# Patient Record
Sex: FEMALE | Race: WHITE | NOT HISPANIC OR LATINO | Employment: FULL TIME | URBAN - NONMETROPOLITAN AREA
[De-identification: names, ages, dates, MRNs, and addresses within clinical notes are randomized per-mention and may not be internally consistent; named-entity substitution may affect disease eponyms.]

---

## 2017-02-17 ENCOUNTER — INITIAL PRENATAL (OUTPATIENT)
Dept: OBSTETRICS AND GYNECOLOGY | Facility: CLINIC | Age: 27
End: 2017-02-17

## 2017-02-17 VITALS
DIASTOLIC BLOOD PRESSURE: 80 MMHG | BODY MASS INDEX: 34.6 KG/M2 | SYSTOLIC BLOOD PRESSURE: 110 MMHG | WEIGHT: 188 LBS | HEIGHT: 62 IN

## 2017-02-17 DIAGNOSIS — Z34.91 PRENATAL CARE, FIRST TRIMESTER: Primary | ICD-10-CM

## 2017-02-17 PROCEDURE — G0123 SCREEN CERV/VAG THIN LAYER: HCPCS | Performed by: NURSE PRACTITIONER

## 2017-02-17 PROCEDURE — 99214 OFFICE O/P EST MOD 30 MIN: CPT | Performed by: NURSE PRACTITIONER

## 2017-02-17 RX ORDER — PANTOPRAZOLE SODIUM 40 MG/1
TABLET, DELAYED RELEASE ORAL
Refills: 1 | COMMUNITY
Start: 2017-01-13 | End: 2017-04-14 | Stop reason: SDUPTHER

## 2017-02-17 RX ORDER — SUCRALFATE 1 G/1
1 TABLET ORAL 4 TIMES DAILY
COMMUNITY
End: 2023-02-22

## 2017-02-17 RX ORDER — ONDANSETRON 4 MG/1
TABLET, ORALLY DISINTEGRATING ORAL
COMMUNITY
Start: 2015-12-31 | End: 2017-04-14

## 2017-02-17 NOTE — PROGRESS NOTES
This is patient's first pregnancy. She has a multitude of genetic disorders on her side and also on the FOB side. Patient's father is BRC Positive  P'ts MGM and maternal aunt have Kris-Danlos syndrome FOB has had brain cancer at 17, his mother had pancreatic cancer and his father has Squamous cell cancer. FOB has a sister with Chiari syndrome. Patient wants to have panorama with her next visit. Pap and physical were done.

## 2017-02-17 NOTE — PATIENT INSTRUCTIONS
First Trimester of Pregnancy  The first trimester of pregnancy is from week 1 until the end of week 12 (months 1 through 3). A week after a sperm fertilizes an egg, the egg will implant on the wall of the uterus. This embryo will begin to develop into a baby. Genes from you and your partner are forming the baby. The male genes determine whether the baby is a boy or a girl. At 6-8 weeks, the eyes and face are formed, and the heartbeat can be seen on ultrasound. At the end of 12 weeks, all the baby's organs are formed.   Now that you are pregnant, you will want to do everything you can to have a healthy baby. Two of the most important things are to get good prenatal care and to follow your health care provider's instructions. Prenatal care is all the medical care you receive before the baby's birth. This care will help prevent, find, and treat any problems during the pregnancy and childbirth.  BODY CHANGES  Your body goes through many changes during pregnancy. The changes vary from woman to woman.   · You may gain or lose a couple of pounds at first.  · You may feel sick to your stomach (nauseous) and throw up (vomit). If the vomiting is uncontrollable, call your health care provider.  · You may tire easily.  · You may develop headaches that can be relieved by medicines approved by your health care provider.  · You may urinate more often. Painful urination may mean you have a bladder infection.  · You may develop heartburn as a result of your pregnancy.  · You may develop constipation because certain hormones are causing the muscles that push waste through your intestines to slow down.  · You may develop hemorrhoids or swollen, bulging veins (varicose veins).  · Your breasts may begin to grow larger and become tender. Your nipples may stick out more, and the tissue that surrounds them (areola) may become darker.  · Your gums may bleed and may be sensitive to brushing and flossing.  · Dark spots or blotches (chloasma,  mask of pregnancy) may develop on your face. This will likely fade after the baby is born.  · Your menstrual periods will stop.  · You may have a loss of appetite.  · You may develop cravings for certain kinds of food.  · You may have changes in your emotions from day to day, such as being excited to be pregnant or being concerned that something may go wrong with the pregnancy and baby.  · You may have more vivid and strange dreams.  · You may have changes in your hair. These can include thickening of your hair, rapid growth, and changes in texture. Some women also have hair loss during or after pregnancy, or hair that feels dry or thin. Your hair will most likely return to normal after your baby is born.  WHAT TO EXPECT AT YOUR PRENATAL VISITS  During a routine prenatal visit:  · You will be weighed to make sure you and the baby are growing normally.  · Your blood pressure will be taken.  · Your abdomen will be measured to track your baby's growth.  · The fetal heartbeat will be listened to starting around week 10 or 12 of your pregnancy.  · Test results from any previous visits will be discussed.  Your health care provider may ask you:  · How you are feeling.  · If you are feeling the baby move.  · If you have had any abnormal symptoms, such as leaking fluid, bleeding, severe headaches, or abdominal cramping.  · If you are using any tobacco products, including cigarettes, chewing tobacco, and electronic cigarettes.  · If you have any questions.  Other tests that may be performed during your first trimester include:  · Blood tests to find your blood type and to check for the presence of any previous infections. They will also be used to check for low iron levels (anemia) and Rh antibodies. Later in the pregnancy, blood tests for diabetes will be done along with other tests if problems develop.  · Urine tests to check for infections, diabetes, or protein in the urine.  · An ultrasound to confirm the proper growth  and development of the baby.  · An amniocentesis to check for possible genetic problems.  · Fetal screens for spina bifida and Down syndrome.  · You may need other tests to make sure you and the baby are doing well.  · HIV (human immunodeficiency virus) testing. Routine prenatal testing includes screening for HIV, unless you choose not to have this test.  HOME CARE INSTRUCTIONS   Medicines  · Follow your health care provider's instructions regarding medicine use. Specific medicines may be either safe or unsafe to take during pregnancy.  · Take your prenatal vitamins as directed.  · If you develop constipation, try taking a stool softener if your health care provider approves.  Diet  · Eat regular, well-balanced meals. Choose a variety of foods, such as meat or vegetable-based protein, fish, milk and low-fat dairy products, vegetables, fruits, and whole grain breads and cereals. Your health care provider will help you determine the amount of weight gain that is right for you.  · Avoid raw meat and uncooked cheese. These carry germs that can cause birth defects in the baby.  · Eating four or five small meals rather than three large meals a day may help relieve nausea and vomiting. If you start to feel nauseous, eating a few soda crackers can be helpful. Drinking liquids between meals instead of during meals also seems to help nausea and vomiting.  · If you develop constipation, eat more high-fiber foods, such as fresh vegetables or fruit and whole grains. Drink enough fluids to keep your urine clear or pale yellow.  Activity and Exercise  · Exercise only as directed by your health care provider. Exercising will help you:    Control your weight.    Stay in shape.    Be prepared for labor and delivery.  · Experiencing pain or cramping in the lower abdomen or low back is a good sign that you should stop exercising. Check with your health care provider before continuing normal exercises.  · Try to avoid standing for long  periods of time. Move your legs often if you must  one place for a long time.  · Avoid heavy lifting.  · Wear low-heeled shoes, and practice good posture.  · You may continue to have sex unless your health care provider directs you otherwise.  Relief of Pain or Discomfort  · Wear a good support bra for breast tenderness.    · Take warm sitz baths to soothe any pain or discomfort caused by hemorrhoids. Use hemorrhoid cream if your health care provider approves.    · Rest with your legs elevated if you have leg cramps or low back pain.  · If you develop varicose veins in your legs, wear support hose. Elevate your feet for 15 minutes, 3-4 times a day. Limit salt in your diet.  Prenatal Care  · Schedule your prenatal visits by the twelfth week of pregnancy. They are usually scheduled monthly at first, then more often in the last 2 months before delivery.  · Write down your questions. Take them to your prenatal visits.  · Keep all your prenatal visits as directed by your health care provider.  Safety  · Wear your seat belt at all times when driving.  · Make a list of emergency phone numbers, including numbers for family, friends, the hospital, and police and fire departments.  General Tips  · Ask your health care provider for a referral to a local prenatal education class. Begin classes no later than at the beginning of month 6 of your pregnancy.  · Ask for help if you have counseling or nutritional needs during pregnancy. Your health care provider can offer advice or refer you to specialists for help with various needs.  · Do not use hot tubs, steam rooms, or saunas.  · Do not douche or use tampons or scented sanitary pads.  · Do not cross your legs for long periods of time.  · Avoid cat litter boxes and soil used by cats. These carry germs that can cause birth defects in the baby and possibly loss of the fetus by miscarriage or stillbirth.  · Avoid all smoking, herbs, alcohol, and medicines not prescribed by  your health care provider. Chemicals in these affect the formation and growth of the baby.  · Do not use any tobacco products, including cigarettes, chewing tobacco, and electronic cigarettes. If you need help quitting, ask your health care provider. You may receive counseling support and other resources to help you quit.  · Schedule a dentist appointment. At home, brush your teeth with a soft toothbrush and be gentle when you floss.  SEEK MEDICAL CARE IF:   · You have dizziness.  · You have mild pelvic cramps, pelvic pressure, or nagging pain in the abdominal area.  · You have persistent nausea, vomiting, or diarrhea.  · You have a bad smelling vaginal discharge.  · You have pain with urination.  · You notice increased swelling in your face, hands, legs, or ankles.  SEEK IMMEDIATE MEDICAL CARE IF:   · You have a fever.  · You are leaking fluid from your vagina.  · You have spotting or bleeding from your vagina.  · You have severe abdominal cramping or pain.  · You have rapid weight gain or loss.  · You vomit blood or material that looks like coffee grounds.  · You are exposed to [place] measles and have never had them.  · You are exposed to fifth disease or chickenpox.  · You develop a severe headache.  · You have shortness of breath.  · You have any kind of trauma, such as from a fall or a car accident.     This information is not intended to replace advice given to you by your health care provider. Make sure you discuss any questions you have with your health care provider.     Document Released: 12/12/2002 Document Revised: 01/08/2016 Document Reviewed: 10/28/2014  [employer] Interactive Patient Education ©2016 [employer]

## 2017-02-20 LAB
GEN CATEG CVX/VAG CYTO-IMP: NORMAL
LAB AP CASE REPORT: NORMAL
LAB AP GYN ADDITIONAL INFORMATION: NORMAL
LAB AP GYN OTHER FINDINGS: NORMAL
Lab: NORMAL
PATH INTERP SPEC-IMP: NORMAL
STAT OF ADQ CVX/VAG CYTO-IMP: NORMAL

## 2017-02-21 LAB
ABO GROUP BLD: NORMAL
AMPHETAMINES UR QL SCN: NEGATIVE NG/ML
BACTERIA UR CULT: NORMAL
BACTERIA UR CULT: NORMAL
BARBITURATES UR QL SCN: NEGATIVE NG/ML
BASOPHILS # BLD AUTO: 0.02 10*3/MM3 (ref 0–0.2)
BASOPHILS NFR BLD AUTO: 0.2 % (ref 0–2)
BENZODIAZ UR QL: NEGATIVE NG/ML
BLD GP AB SCN SERPL QL: NEGATIVE
BZE UR QL: NEGATIVE NG/ML
C TRACH RRNA SPEC QL NAA+PROBE: NEGATIVE
CANNABINOIDS UR QL SCN: NEGATIVE NG/ML
EOSINOPHIL # BLD AUTO: 0.03 10*3/MM3 (ref 0–0.7)
EOSINOPHIL NFR BLD AUTO: 0.3 % (ref 0–4)
ERYTHROCYTE [DISTWIDTH] IN BLOOD BY AUTOMATED COUNT: 13.4 % (ref 12–15)
HBV SURFACE AG SERPL QL IA: NEGATIVE
HCT VFR BLD AUTO: 39.4 % (ref 37–47)
HGB BLD-MCNC: 12.9 G/DL (ref 12–16)
HIV 1+2 AB+HIV1 P24 AG SERPL QL IA: NON REACTIVE
IMM GRANULOCYTES # BLD: 0.03 10*3/MM3 (ref 0–0.03)
IMM GRANULOCYTES NFR BLD: 0.3 % (ref 0–5)
LYMPHOCYTES # BLD AUTO: 2.63 10*3/MM3 (ref 0.72–4.86)
LYMPHOCYTES NFR BLD AUTO: 24.4 % (ref 15–45)
MCH RBC QN AUTO: 30.1 PG (ref 28–32)
MCHC RBC AUTO-ENTMCNC: 32.7 G/DL (ref 33–36)
MCV RBC AUTO: 92.1 FL (ref 82–98)
METHADONE UR QL SCN: NEGATIVE NG/ML
MONOCYTES # BLD AUTO: 0.59 10*3/MM3 (ref 0.19–1.3)
MONOCYTES NFR BLD AUTO: 5.5 % (ref 4–12)
N GONORRHOEA RRNA SPEC QL NAA+PROBE: NEGATIVE
NEUTROPHILS # BLD AUTO: 7.5 10*3/MM3 (ref 1.87–8.4)
NEUTROPHILS NFR BLD AUTO: 69.3 % (ref 39–78)
NRBC BLD AUTO-RTO: 0 /100 WBC (ref 0–0)
OPIATES UR QL: NEGATIVE NG/ML
PCP UR QL: NEGATIVE NG/ML
PLATELET # BLD AUTO: 298 10*3/MM3 (ref 130–400)
PROPOXYPH UR QL: NEGATIVE NG/ML
RBC # BLD AUTO: 4.28 10*6/MM3 (ref 4.2–5.4)
RH BLD: POSITIVE
RPR SER QL: NON REACTIVE
RUBV IGG SERPL IA-ACNC: 2.4 INDEX
WBC # BLD AUTO: 10.8 10*3/MM3 (ref 4.8–10.8)

## 2017-02-24 ENCOUNTER — PROCEDURE VISIT (OUTPATIENT)
Dept: OBSTETRICS AND GYNECOLOGY | Facility: CLINIC | Age: 27
End: 2017-02-24

## 2017-02-24 DIAGNOSIS — O36.80X0 ENCOUNTER TO DETERMINE FETAL VIABILITY OF PREGNANCY, NOT APPLICABLE OR UNSPECIFIED FETUS: Primary | ICD-10-CM

## 2017-02-24 PROCEDURE — 76801 OB US < 14 WKS SINGLE FETUS: CPT | Performed by: OBSTETRICS & GYNECOLOGY

## 2017-03-17 ENCOUNTER — ROUTINE PRENATAL (OUTPATIENT)
Dept: OBSTETRICS AND GYNECOLOGY | Facility: CLINIC | Age: 27
End: 2017-03-17

## 2017-03-17 VITALS — WEIGHT: 188 LBS | SYSTOLIC BLOOD PRESSURE: 112 MMHG | DIASTOLIC BLOOD PRESSURE: 70 MMHG | BODY MASS INDEX: 34.39 KG/M2

## 2017-03-17 DIAGNOSIS — L29.8 PRURITUS OF PALM: ICD-10-CM

## 2017-03-17 DIAGNOSIS — Z34.01 PRENATAL CARE, FIRST PREGNANCY, FIRST TRIMESTER: Primary | ICD-10-CM

## 2017-03-17 LAB
ALBUMIN SERPL-MCNC: 3.8 G/DL (ref 3.5–5)
ALBUMIN/GLOB SERPL: 1.4 G/DL (ref 1.1–2.5)
ALP SERPL-CCNC: 94 U/L (ref 24–120)
ALT SERPL-CCNC: 18 U/L (ref 0–54)
AST SERPL-CCNC: 25 U/L (ref 7–45)
BILIRUB SERPL-MCNC: 0.3 MG/DL (ref 0.1–1)
BUN SERPL-MCNC: 6 MG/DL (ref 5–21)
BUN/CREAT SERPL: 10 (ref 7–25)
CALCIUM SERPL-MCNC: 9.4 MG/DL (ref 8.4–10.4)
CHLORIDE SERPL-SCNC: 102 MMOL/L (ref 98–110)
CO2 SERPL-SCNC: 24 MMOL/L (ref 24–31)
CREAT SERPL-MCNC: 0.6 MG/DL (ref 0.5–1.4)
GLOBULIN SER CALC-MCNC: 2.8 GM/DL
GLUCOSE SERPL-MCNC: 78 MG/DL (ref 70–100)
POTASSIUM SERPL-SCNC: 4.1 MMOL/L (ref 3.5–5.3)
PROT SERPL-MCNC: 6.6 G/DL (ref 6.3–8.7)
SODIUM SERPL-SCNC: 135 MMOL/L (ref 135–145)

## 2017-03-17 PROCEDURE — 99213 OFFICE O/P EST LOW 20 MIN: CPT | Performed by: NURSE PRACTITIONER

## 2017-03-17 NOTE — PROGRESS NOTES
Still having n/v but able to keep watermelon down. Has normal aches and pains associated with pregnancy. Has itchy palms but no rash Can try benadryl and will get cmp with her panorama screening.  Cord blood pamphlet given.

## 2017-03-17 NOTE — PATIENT INSTRUCTIONS
First Trimester of Pregnancy  The first trimester of pregnancy is from week 1 until the end of week 12 (months 1 through 3). A week after a sperm fertilizes an egg, the egg will implant on the wall of the uterus. This embryo will begin to develop into a baby. Genes from you and your partner are forming the baby. The male genes determine whether the baby is a boy or a girl. At 6-8 weeks, the eyes and face are formed, and the heartbeat can be seen on ultrasound. At the end of 12 weeks, all the baby's organs are formed.   Now that you are pregnant, you will want to do everything you can to have a healthy baby. Two of the most important things are to get good prenatal care and to follow your health care provider's instructions. Prenatal care is all the medical care you receive before the baby's birth. This care will help prevent, find, and treat any problems during the pregnancy and childbirth.  BODY CHANGES  Your body goes through many changes during pregnancy. The changes vary from woman to woman.   · You may gain or lose a couple of pounds at first.  · You may feel sick to your stomach (nauseous) and throw up (vomit). If the vomiting is uncontrollable, call your health care provider.  · You may tire easily.  · You may develop headaches that can be relieved by medicines approved by your health care provider.  · You may urinate more often. Painful urination may mean you have a bladder infection.  · You may develop heartburn as a result of your pregnancy.  · You may develop constipation because certain hormones are causing the muscles that push waste through your intestines to slow down.  · You may develop hemorrhoids or swollen, bulging veins (varicose veins).  · Your breasts may begin to grow larger and become tender. Your nipples may stick out more, and the tissue that surrounds them (areola) may become darker.  · Your gums may bleed and may be sensitive to brushing and flossing.  · Dark spots or blotches (chloasma,  mask of pregnancy) may develop on your face. This will likely fade after the baby is born.  · Your menstrual periods will stop.  · You may have a loss of appetite.  · You may develop cravings for certain kinds of food.  · You may have changes in your emotions from day to day, such as being excited to be pregnant or being concerned that something may go wrong with the pregnancy and baby.  · You may have more vivid and strange dreams.  · You may have changes in your hair. These can include thickening of your hair, rapid growth, and changes in texture. Some women also have hair loss during or after pregnancy, or hair that feels dry or thin. Your hair will most likely return to normal after your baby is born.  WHAT TO EXPECT AT YOUR PRENATAL VISITS  During a routine prenatal visit:  · You will be weighed to make sure you and the baby are growing normally.  · Your blood pressure will be taken.  · Your abdomen will be measured to track your baby's growth.  · The fetal heartbeat will be listened to starting around week 10 or 12 of your pregnancy.  · Test results from any previous visits will be discussed.  Your health care provider may ask you:  · How you are feeling.  · If you are feeling the baby move.  · If you have had any abnormal symptoms, such as leaking fluid, bleeding, severe headaches, or abdominal cramping.  · If you are using any tobacco products, including cigarettes, chewing tobacco, and electronic cigarettes.  · If you have any questions.  Other tests that may be performed during your first trimester include:  · Blood tests to find your blood type and to check for the presence of any previous infections. They will also be used to check for low iron levels (anemia) and Rh antibodies. Later in the pregnancy, blood tests for diabetes will be done along with other tests if problems develop.  · Urine tests to check for infections, diabetes, or protein in the urine.  · An ultrasound to confirm the proper growth  and development of the baby.  · An amniocentesis to check for possible genetic problems.  · Fetal screens for spina bifida and Down syndrome.  · You may need other tests to make sure you and the baby are doing well.  · HIV (human immunodeficiency virus) testing. Routine prenatal testing includes screening for HIV, unless you choose not to have this test.  HOME CARE INSTRUCTIONS   Medicines  · Follow your health care provider's instructions regarding medicine use. Specific medicines may be either safe or unsafe to take during pregnancy.  · Take your prenatal vitamins as directed.  · If you develop constipation, try taking a stool softener if your health care provider approves.  Diet  · Eat regular, well-balanced meals. Choose a variety of foods, such as meat or vegetable-based protein, fish, milk and low-fat dairy products, vegetables, fruits, and whole grain breads and cereals. Your health care provider will help you determine the amount of weight gain that is right for you.  · Avoid raw meat and uncooked cheese. These carry germs that can cause birth defects in the baby.  · Eating four or five small meals rather than three large meals a day may help relieve nausea and vomiting. If you start to feel nauseous, eating a few soda crackers can be helpful. Drinking liquids between meals instead of during meals also seems to help nausea and vomiting.  · If you develop constipation, eat more high-fiber foods, such as fresh vegetables or fruit and whole grains. Drink enough fluids to keep your urine clear or pale yellow.  Activity and Exercise  · Exercise only as directed by your health care provider. Exercising will help you:    Control your weight.    Stay in shape.    Be prepared for labor and delivery.  · Experiencing pain or cramping in the lower abdomen or low back is a good sign that you should stop exercising. Check with your health care provider before continuing normal exercises.  · Try to avoid standing for long  periods of time. Move your legs often if you must  one place for a long time.  · Avoid heavy lifting.  · Wear low-heeled shoes, and practice good posture.  · You may continue to have sex unless your health care provider directs you otherwise.  Relief of Pain or Discomfort  · Wear a good support bra for breast tenderness.    · Take warm sitz baths to soothe any pain or discomfort caused by hemorrhoids. Use hemorrhoid cream if your health care provider approves.    · Rest with your legs elevated if you have leg cramps or low back pain.  · If you develop varicose veins in your legs, wear support hose. Elevate your feet for 15 minutes, 3-4 times a day. Limit salt in your diet.  Prenatal Care  · Schedule your prenatal visits by the twelfth week of pregnancy. They are usually scheduled monthly at first, then more often in the last 2 months before delivery.  · Write down your questions. Take them to your prenatal visits.  · Keep all your prenatal visits as directed by your health care provider.  Safety  · Wear your seat belt at all times when driving.  · Make a list of emergency phone numbers, including numbers for family, friends, the hospital, and police and fire departments.  General Tips  · Ask your health care provider for a referral to a local prenatal education class. Begin classes no later than at the beginning of month 6 of your pregnancy.  · Ask for help if you have counseling or nutritional needs during pregnancy. Your health care provider can offer advice or refer you to specialists for help with various needs.  · Do not use hot tubs, steam rooms, or saunas.  · Do not douche or use tampons or scented sanitary pads.  · Do not cross your legs for long periods of time.  · Avoid cat litter boxes and soil used by cats. These carry germs that can cause birth defects in the baby and possibly loss of the fetus by miscarriage or stillbirth.  · Avoid all smoking, herbs, alcohol, and medicines not prescribed by  your health care provider. Chemicals in these affect the formation and growth of the baby.  · Do not use any tobacco products, including cigarettes, chewing tobacco, and electronic cigarettes. If you need help quitting, ask your health care provider. You may receive counseling support and other resources to help you quit.  · Schedule a dentist appointment. At home, brush your teeth with a soft toothbrush and be gentle when you floss.  SEEK MEDICAL CARE IF:   · You have dizziness.  · You have mild pelvic cramps, pelvic pressure, or nagging pain in the abdominal area.  · You have persistent nausea, vomiting, or diarrhea.  · You have a bad smelling vaginal discharge.  · You have pain with urination.  · You notice increased swelling in your face, hands, legs, or ankles.  SEEK IMMEDIATE MEDICAL CARE IF:   · You have a fever.  · You are leaking fluid from your vagina.  · You have spotting or bleeding from your vagina.  · You have severe abdominal cramping or pain.  · You have rapid weight gain or loss.  · You vomit blood or material that looks like coffee grounds.  · You are exposed to [place] measles and have never had them.  · You are exposed to fifth disease or chickenpox.  · You develop a severe headache.  · You have shortness of breath.  · You have any kind of trauma, such as from a fall or a car accident.     This information is not intended to replace advice given to you by your health care provider. Make sure you discuss any questions you have with your health care provider.     Document Released: 12/12/2002 Document Revised: 01/08/2016 Document Reviewed: 10/28/2014  [employer] Interactive Patient Education ©2016 [employer]

## 2017-03-28 ENCOUNTER — TELEPHONE (OUTPATIENT)
Dept: OBSTETRICS AND GYNECOLOGY | Facility: CLINIC | Age: 27
End: 2017-03-28

## 2017-03-28 NOTE — TELEPHONE ENCOUNTER
Patient calls with complaints of vomiting for the past 2 days.   States her significant other has been sick over the weekend with a bug.  Offered to send in supp, patient states she also has diarrhea with this.  Advised to seek care from PCP or walk in clinic.

## 2017-04-14 ENCOUNTER — ROUTINE PRENATAL (OUTPATIENT)
Dept: OBSTETRICS AND GYNECOLOGY | Facility: CLINIC | Age: 27
End: 2017-04-14

## 2017-04-14 VITALS — BODY MASS INDEX: 33.84 KG/M2 | WEIGHT: 185 LBS | DIASTOLIC BLOOD PRESSURE: 70 MMHG | SYSTOLIC BLOOD PRESSURE: 115 MMHG

## 2017-04-14 DIAGNOSIS — O21.9 NAUSEA AND VOMITING OF PREGNANCY, ANTEPARTUM: ICD-10-CM

## 2017-04-14 DIAGNOSIS — Z34.02 ENCOUNTER FOR SUPERVISION OF NORMAL FIRST PREGNANCY IN SECOND TRIMESTER: Primary | ICD-10-CM

## 2017-04-14 PROCEDURE — 99213 OFFICE O/P EST LOW 20 MIN: CPT | Performed by: OBSTETRICS & GYNECOLOGY

## 2017-04-14 RX ORDER — PANTOPRAZOLE SODIUM 40 MG/1
40 TABLET, DELAYED RELEASE ORAL DAILY
Qty: 30 TABLET | Refills: 3 | Status: SHIPPED | OUTPATIENT
Start: 2017-04-14 | End: 2023-02-22

## 2017-04-14 RX ORDER — ONDANSETRON 4 MG/1
4 TABLET, ORALLY DISINTEGRATING ORAL EVERY 8 HOURS PRN
Qty: 30 TABLET | Refills: 3 | Status: SHIPPED | OUTPATIENT
Start: 2017-04-14 | End: 2017-09-05 | Stop reason: SDUPTHER

## 2017-04-14 RX ORDER — METOCLOPRAMIDE 10 MG/1
10 TABLET ORAL 3 TIMES DAILY PRN
Qty: 45 TABLET | Refills: 3 | Status: SHIPPED | OUTPATIENT
Start: 2017-04-14 | End: 2017-09-05 | Stop reason: SDUPTHER

## 2017-04-14 RX ORDER — PROMETHAZINE HYDROCHLORIDE 25 MG/1
25 SUPPOSITORY RECTAL EVERY 6 HOURS PRN
Qty: 6 SUPPOSITORY | Refills: 1 | Status: SHIPPED | OUTPATIENT
Start: 2017-04-14 | End: 2017-09-05 | Stop reason: SDUPTHER

## 2017-04-14 NOTE — PATIENT INSTRUCTIONS
Doxylamine (Unisom) 1/2 or 1/4 tablet plus Vitamin B6 1 pill every 6 hours as needed for pregnancy nausea.  Take together for best result.

## 2017-04-14 NOTE — PROGRESS NOTES
Normal ffDNA, BOY!  Still with some nausea, emesis several times a day, weight down 3 pounds  Schedule anatomy US  Has tried Phenergan, Zofran, and Diclegis, will Rx Reglan and try combo of meds   Discussed keeping liquids and full liquids down

## 2017-04-21 ENCOUNTER — ROUTINE PRENATAL (OUTPATIENT)
Dept: OBSTETRICS AND GYNECOLOGY | Facility: CLINIC | Age: 27
End: 2017-04-21

## 2017-04-21 ENCOUNTER — TELEPHONE (OUTPATIENT)
Dept: OBSTETRICS AND GYNECOLOGY | Facility: CLINIC | Age: 27
End: 2017-04-21

## 2017-04-21 VITALS — DIASTOLIC BLOOD PRESSURE: 64 MMHG | SYSTOLIC BLOOD PRESSURE: 116 MMHG | WEIGHT: 184 LBS | BODY MASS INDEX: 33.65 KG/M2

## 2017-04-21 DIAGNOSIS — O47.02 FALSE LABOR BEFORE 37 COMPLETED WEEKS OF GESTATION IN SECOND TRIMESTER: Primary | ICD-10-CM

## 2017-04-21 PROCEDURE — 99213 OFFICE O/P EST LOW 20 MIN: CPT | Performed by: OBSTETRICS & GYNECOLOGY

## 2017-04-21 NOTE — TELEPHONE ENCOUNTER
Patient calls stating that since having sex last night, she has been leaking clear fluid continuously to the point she has to wear a panty liner.  Advised to come in now.

## 2017-04-21 NOTE — PROGRESS NOTES
Here complaining of possible leaking fluid since last night.  She had intercourse last night.  She denies vaginal bleeding or cramping.  Abdomen nontender, on speculum examination there is no fluid visible.  There is normal discharge present.  Nitrazine is negative, negative lizz  I reassured her today that it did not see any evidence of leaking amniotic fluid.  If her symptoms worsen or change she will contact the office for repeat evaluation and otherwise follow up as needed.

## 2017-04-28 ENCOUNTER — ROUTINE PRENATAL (OUTPATIENT)
Dept: OBSTETRICS AND GYNECOLOGY | Facility: CLINIC | Age: 27
End: 2017-04-28

## 2017-04-28 VITALS — DIASTOLIC BLOOD PRESSURE: 72 MMHG | SYSTOLIC BLOOD PRESSURE: 120 MMHG | WEIGHT: 189 LBS | BODY MASS INDEX: 34.57 KG/M2

## 2017-04-28 DIAGNOSIS — Z34.02 ENCOUNTER FOR SUPERVISION OF NORMAL FIRST PREGNANCY IN SECOND TRIMESTER: Primary | ICD-10-CM

## 2017-04-28 PROCEDURE — 99213 OFFICE O/P EST LOW 20 MIN: CPT | Performed by: OBSTETRICS & GYNECOLOGY

## 2017-04-28 NOTE — PROGRESS NOTES
Still with occasional nausea, but improving  Weight up 5# since last week  Feeling fetal movements  Has MFM appt on 5/9  Abdomen nontender, no edema  Will continue nausea meds, discussed dietary and weight gain goals

## 2017-05-26 ENCOUNTER — ROUTINE PRENATAL (OUTPATIENT)
Dept: OBSTETRICS AND GYNECOLOGY | Facility: CLINIC | Age: 27
End: 2017-05-26

## 2017-05-26 VITALS — WEIGHT: 192 LBS | BODY MASS INDEX: 35.12 KG/M2 | DIASTOLIC BLOOD PRESSURE: 80 MMHG | SYSTOLIC BLOOD PRESSURE: 110 MMHG

## 2017-05-26 DIAGNOSIS — Z34.02 ENCOUNTER FOR SUPERVISION OF NORMAL FIRST PREGNANCY IN SECOND TRIMESTER: Primary | ICD-10-CM

## 2017-05-26 PROCEDURE — 99213 OFFICE O/P EST LOW 20 MIN: CPT | Performed by: OBSTETRICS & GYNECOLOGY

## 2017-06-13 ENCOUNTER — TELEPHONE (OUTPATIENT)
Dept: OBSTETRICS AND GYNECOLOGY | Facility: CLINIC | Age: 27
End: 2017-06-13

## 2017-06-13 NOTE — TELEPHONE ENCOUNTER
Mildred called and states she is having a lot of hip, lower back and leg pain.  States her legs and feet are also swelling.  Advised Mildred to increase her fluid intake and stay off her feet as much as possible and keep her feet and legs elevated.  Also advised patient to limit her salt intake.  Patient states she has been taking Tylenol.  Patient is scheduled for a visit in our office on 6/20/17.  PP

## 2017-06-20 ENCOUNTER — ROUTINE PRENATAL (OUTPATIENT)
Dept: OBSTETRICS AND GYNECOLOGY | Facility: CLINIC | Age: 27
End: 2017-06-20

## 2017-06-20 VITALS — DIASTOLIC BLOOD PRESSURE: 64 MMHG | SYSTOLIC BLOOD PRESSURE: 132 MMHG | WEIGHT: 194 LBS | BODY MASS INDEX: 35.48 KG/M2

## 2017-06-20 DIAGNOSIS — Z34.02 ENCOUNTER FOR SUPERVISION OF NORMAL FIRST PREGNANCY IN SECOND TRIMESTER: Primary | ICD-10-CM

## 2017-06-20 LAB
GLUCOSE 1H P 50 G GLC PO SERPL-MCNC: 99 MG/DL (ref 70–140)
HGB BLD-MCNC: 12 G/DL (ref 12–16)

## 2017-06-20 PROCEDURE — 99212 OFFICE O/P EST SF 10 MIN: CPT | Performed by: OBSTETRICS & GYNECOLOGY

## 2017-06-20 NOTE — PROGRESS NOTES
Good fetal movement, Taking Protonix for reflux  Feeling well, had some edema several days ago, has resolved  Abdomen nontender, no edema  Glucola and Hgb today

## 2017-06-20 NOTE — PROGRESS NOTES
Pt is having a lot of swelling in her feet and legs, PT states about 2 days ago she was swelling in her neck Pt is having some pelvic and back pain,  GTT testing today

## 2017-07-03 ENCOUNTER — ROUTINE PRENATAL (OUTPATIENT)
Dept: OBSTETRICS AND GYNECOLOGY | Facility: CLINIC | Age: 27
End: 2017-07-03

## 2017-07-03 VITALS — BODY MASS INDEX: 36.95 KG/M2 | DIASTOLIC BLOOD PRESSURE: 80 MMHG | SYSTOLIC BLOOD PRESSURE: 124 MMHG | WEIGHT: 202 LBS

## 2017-07-03 DIAGNOSIS — Z34.02 ENCOUNTER FOR SUPERVISION OF NORMAL FIRST PREGNANCY IN SECOND TRIMESTER: Primary | ICD-10-CM

## 2017-07-03 DIAGNOSIS — M25.551 PAIN OF BOTH HIP JOINTS: ICD-10-CM

## 2017-07-03 DIAGNOSIS — M25.552 PAIN OF BOTH HIP JOINTS: ICD-10-CM

## 2017-07-03 PROCEDURE — 99213 OFFICE O/P EST LOW 20 MIN: CPT | Performed by: OBSTETRICS & GYNECOLOGY

## 2017-07-03 NOTE — PROGRESS NOTES
Good fetal movement  Has low back and hip instability and has fallen a few times, no reflux  Abdomen nontender, no edema  Glucola and Hgb normal   PT referral for evaluation

## 2017-07-17 ENCOUNTER — ROUTINE PRENATAL (OUTPATIENT)
Dept: OBSTETRICS AND GYNECOLOGY | Facility: CLINIC | Age: 27
End: 2017-07-17

## 2017-07-17 ENCOUNTER — PROCEDURE VISIT (OUTPATIENT)
Dept: OBSTETRICS AND GYNECOLOGY | Facility: CLINIC | Age: 27
End: 2017-07-17

## 2017-07-17 VITALS — WEIGHT: 205 LBS | BODY MASS INDEX: 37.49 KG/M2 | DIASTOLIC BLOOD PRESSURE: 76 MMHG | SYSTOLIC BLOOD PRESSURE: 100 MMHG

## 2017-07-17 DIAGNOSIS — O99.210 OBESITY IN PREGNANCY: Primary | ICD-10-CM

## 2017-07-17 DIAGNOSIS — Z34.02 ENCOUNTER FOR SUPERVISION OF NORMAL FIRST PREGNANCY IN SECOND TRIMESTER: Primary | ICD-10-CM

## 2017-07-17 PROCEDURE — 99213 OFFICE O/P EST LOW 20 MIN: CPT | Performed by: OBSTETRICS & GYNECOLOGY

## 2017-07-17 NOTE — PROGRESS NOTES
Good fetal movement  Feeling well, has had a few contractions  Abdomen nontender, no edema  US today shows 83% growth, LOUIE 15cm   labor precautions

## 2017-07-31 ENCOUNTER — ROUTINE PRENATAL (OUTPATIENT)
Dept: OBSTETRICS AND GYNECOLOGY | Facility: CLINIC | Age: 27
End: 2017-07-31

## 2017-07-31 VITALS — BODY MASS INDEX: 38.04 KG/M2 | DIASTOLIC BLOOD PRESSURE: 74 MMHG | SYSTOLIC BLOOD PRESSURE: 120 MMHG | WEIGHT: 208 LBS

## 2017-07-31 DIAGNOSIS — Z34.03 ENCOUNTER FOR SUPERVISION OF NORMAL FIRST PREGNANCY IN THIRD TRIMESTER: Primary | ICD-10-CM

## 2017-07-31 PROCEDURE — 90471 IMMUNIZATION ADMIN: CPT | Performed by: OBSTETRICS & GYNECOLOGY

## 2017-07-31 PROCEDURE — 90715 TDAP VACCINE 7 YRS/> IM: CPT | Performed by: OBSTETRICS & GYNECOLOGY

## 2017-07-31 PROCEDURE — 99213 OFFICE O/P EST LOW 20 MIN: CPT | Performed by: OBSTETRICS & GYNECOLOGY

## 2017-07-31 NOTE — PROGRESS NOTES
Had some cramping over the weekend  Was associated with recent nausea and emesis  No nausea or cramping today  Good fetal movement  Abdomen nontender, no edema  Cervix is closed and thick  Tdap in office today   labor precautions

## 2017-08-11 ENCOUNTER — HOSPITAL ENCOUNTER (OUTPATIENT)
Facility: HOSPITAL | Age: 27
Discharge: HOME | End: 2017-08-11
Attending: OBSTETRICS & GYNECOLOGY | Admitting: OBSTETRICS & GYNECOLOGY

## 2017-08-11 VITALS
SYSTOLIC BLOOD PRESSURE: 117 MMHG | HEART RATE: 95 BPM | RESPIRATION RATE: 18 BRPM | TEMPERATURE: 98 F | DIASTOLIC BLOOD PRESSURE: 77 MMHG | WEIGHT: 213 LBS | BODY MASS INDEX: 38.96 KG/M2

## 2017-08-11 PROBLEM — Z37.9 NORMAL LABOR: Status: ACTIVE | Noted: 2017-08-11

## 2017-08-11 LAB — GLUCOSE BLDC GLUCOMTR-MCNC: 97 MG/DL (ref 70–130)

## 2017-08-11 PROCEDURE — 82962 GLUCOSE BLOOD TEST: CPT

## 2017-08-11 PROCEDURE — G0463 HOSPITAL OUTPT CLINIC VISIT: HCPCS

## 2017-08-14 ENCOUNTER — ROUTINE PRENATAL (OUTPATIENT)
Dept: OBSTETRICS AND GYNECOLOGY | Facility: CLINIC | Age: 27
End: 2017-08-14

## 2017-08-14 VITALS — BODY MASS INDEX: 38.59 KG/M2 | WEIGHT: 211 LBS | SYSTOLIC BLOOD PRESSURE: 122 MMHG | DIASTOLIC BLOOD PRESSURE: 78 MMHG

## 2017-08-14 DIAGNOSIS — Z34.03 ENCOUNTER FOR SUPERVISION OF NORMAL FIRST PREGNANCY IN THIRD TRIMESTER: Primary | ICD-10-CM

## 2017-08-14 PROBLEM — Z37.9 NORMAL LABOR: Status: RESOLVED | Noted: 2017-08-11 | Resolved: 2017-08-14

## 2017-08-14 PROCEDURE — 99213 OFFICE O/P EST LOW 20 MIN: CPT | Performed by: OBSTETRICS & GYNECOLOGY

## 2017-08-14 NOTE — PROGRESS NOTES
Good fetal movement, was seen on L&D 3 days ago for decreased fetal movement  Feeling well, no contractions  Abdomen nontender, trace edema  Labor precautions  GBS and cx's around 36 weeks

## 2017-08-28 ENCOUNTER — ROUTINE PRENATAL (OUTPATIENT)
Dept: OBSTETRICS AND GYNECOLOGY | Facility: CLINIC | Age: 27
End: 2017-08-28

## 2017-08-28 VITALS — BODY MASS INDEX: 40.06 KG/M2 | DIASTOLIC BLOOD PRESSURE: 80 MMHG | WEIGHT: 219 LBS | SYSTOLIC BLOOD PRESSURE: 124 MMHG

## 2017-08-28 DIAGNOSIS — Z34.03 ENCOUNTER FOR SUPERVISION OF NORMAL FIRST PREGNANCY IN THIRD TRIMESTER: Primary | ICD-10-CM

## 2017-08-28 PROCEDURE — 99213 OFFICE O/P EST LOW 20 MIN: CPT | Performed by: OBSTETRICS & GYNECOLOGY

## 2017-08-28 NOTE — PROGRESS NOTES
Good fetal movement  Feeling well, no contractions  Abdomen nontender, no edema  GBS and cx's done  Labor instructions

## 2017-08-31 LAB
C TRACH RRNA SPEC QL NAA+PROBE: NEGATIVE
GP B STREP DNA SPEC QL NAA+PROBE: POSITIVE
N GONORRHOEA RRNA SPEC QL NAA+PROBE: NEGATIVE

## 2017-09-05 ENCOUNTER — ROUTINE PRENATAL (OUTPATIENT)
Dept: OBSTETRICS AND GYNECOLOGY | Facility: CLINIC | Age: 27
End: 2017-09-05

## 2017-09-05 VITALS — BODY MASS INDEX: 40.79 KG/M2 | DIASTOLIC BLOOD PRESSURE: 82 MMHG | SYSTOLIC BLOOD PRESSURE: 124 MMHG | WEIGHT: 223 LBS

## 2017-09-05 DIAGNOSIS — O21.9 NAUSEA AND VOMITING OF PREGNANCY, ANTEPARTUM: ICD-10-CM

## 2017-09-05 DIAGNOSIS — B95.1 POSITIVE GBS TEST: ICD-10-CM

## 2017-09-05 DIAGNOSIS — Z78.9 NON-SMOKER: ICD-10-CM

## 2017-09-05 DIAGNOSIS — Z34.93 PRENATAL CARE, THIRD TRIMESTER: Primary | ICD-10-CM

## 2017-09-05 PROCEDURE — 99212 OFFICE O/P EST SF 10 MIN: CPT | Performed by: OBSTETRICS & GYNECOLOGY

## 2017-09-05 RX ORDER — ONDANSETRON 4 MG/1
4 TABLET, ORALLY DISINTEGRATING ORAL EVERY 8 HOURS PRN
Qty: 30 TABLET | Refills: 3 | Status: SHIPPED | OUTPATIENT
Start: 2017-09-05 | End: 2019-11-12 | Stop reason: SDUPTHER

## 2017-09-05 RX ORDER — METOCLOPRAMIDE 10 MG/1
10 TABLET ORAL 3 TIMES DAILY PRN
Qty: 45 TABLET | Refills: 3 | Status: SHIPPED | OUTPATIENT
Start: 2017-09-05

## 2017-09-05 RX ORDER — PROMETHAZINE HYDROCHLORIDE 25 MG/1
25 SUPPOSITORY RECTAL EVERY 6 HOURS PRN
Qty: 6 SUPPOSITORY | Refills: 1 | Status: SHIPPED | OUTPATIENT
Start: 2017-09-05 | End: 2023-02-22

## 2017-09-05 NOTE — PROGRESS NOTES
Kick count instructions were reviewed and encouraged.  Labor signs and symptoms were reviewed.  Patient was encouraged to come to hospital or call for bleeding, leakage of fluid or any other concerns.    GBS +; discussed with patient

## 2017-09-11 ENCOUNTER — ROUTINE PRENATAL (OUTPATIENT)
Dept: OBSTETRICS AND GYNECOLOGY | Facility: CLINIC | Age: 27
End: 2017-09-11

## 2017-09-11 VITALS — BODY MASS INDEX: 40.6 KG/M2 | DIASTOLIC BLOOD PRESSURE: 84 MMHG | SYSTOLIC BLOOD PRESSURE: 128 MMHG | WEIGHT: 222 LBS

## 2017-09-11 DIAGNOSIS — Z34.02 ENCOUNTER FOR SUPERVISION OF NORMAL FIRST PREGNANCY IN SECOND TRIMESTER: Primary | ICD-10-CM

## 2017-09-11 PROCEDURE — 99213 OFFICE O/P EST LOW 20 MIN: CPT | Performed by: OBSTETRICS & GYNECOLOGY

## 2017-09-11 NOTE — PROGRESS NOTES
Good fetal movement, no headache  Right sciatica, few contractions last night  Abdomen nontender, no edema  Cervix soft and mid position

## 2019-11-12 DIAGNOSIS — R11.2 NAUSEA AND VOMITING, INTRACTABILITY OF VOMITING NOT SPECIFIED, UNSPECIFIED VOMITING TYPE: Primary | ICD-10-CM

## 2019-11-12 RX ORDER — ONDANSETRON 8 MG/1
8 TABLET, ORALLY DISINTEGRATING ORAL EVERY 8 HOURS PRN
Qty: 20 TABLET | Refills: 1 | Status: SHIPPED | OUTPATIENT
Start: 2019-11-12 | End: 2023-02-22

## 2019-11-15 ENCOUNTER — CLINICAL SUPPORT (OUTPATIENT)
Dept: INTERNAL MEDICINE | Facility: CLINIC | Age: 29
End: 2019-11-15

## 2019-11-15 DIAGNOSIS — Z02.1 DRUG SCREENING, PRE-EMPLOYMENT: Primary | ICD-10-CM

## 2019-11-15 PROBLEM — Z34.02 ENCOUNTER FOR SUPERVISION OF NORMAL FIRST PREGNANCY IN SECOND TRIMESTER: Status: RESOLVED | Noted: 2017-04-14 | Resolved: 2019-11-15

## 2019-11-15 PROCEDURE — 80305 DRUG TEST PRSMV DIR OPT OBS: CPT | Performed by: FAMILY MEDICINE

## 2019-11-24 LAB — DRUGS UR: NORMAL

## 2022-09-20 ENCOUNTER — OFFICE VISIT (OUTPATIENT)
Dept: INTERNAL MEDICINE | Facility: CLINIC | Age: 32
End: 2022-09-20

## 2022-09-20 VITALS
OXYGEN SATURATION: 99 % | BODY MASS INDEX: 34.3 KG/M2 | DIASTOLIC BLOOD PRESSURE: 79 MMHG | HEART RATE: 97 BPM | HEIGHT: 62 IN | SYSTOLIC BLOOD PRESSURE: 112 MMHG | TEMPERATURE: 98.7 F | WEIGHT: 186.4 LBS

## 2022-09-20 DIAGNOSIS — F98.8 ATTENTION DEFICIT DISORDER (ADD) IN ADULT: ICD-10-CM

## 2022-09-20 DIAGNOSIS — N93.9 ABNORMAL VAGINAL BLEEDING: Primary | ICD-10-CM

## 2022-09-20 PROCEDURE — 99204 OFFICE O/P NEW MOD 45 MIN: CPT | Performed by: INTERNAL MEDICINE

## 2022-09-20 RX ORDER — METHYLPHENIDATE HYDROCHLORIDE 27 MG/1
27 TABLET ORAL EVERY MORNING
Qty: 30 TABLET | Refills: 0 | Status: SHIPPED | OUTPATIENT
Start: 2022-09-20 | End: 2022-11-01

## 2022-09-20 NOTE — PROGRESS NOTES
Subjective     Chief Complaint   Patient presents with   • Anxiety   • Depression   • Wellness Check       History of Present Illness   Baby is 3 months old. Patient has had abnormal vaginal bleeding.   Off and on heavy bleeding. Large clots and patient is tired.   Decreased focusing. Unable to get daily tasks completed.     Stopped taking her Lexapro because it made her mean.     Patient's PMR from outside medical facility reviewed and noted.    Review of Systems   Constitutional: Positive for fatigue. Negative for chills and fever.   HENT: Negative for congestion and rhinorrhea.    Respiratory: Negative for cough and shortness of breath.    Cardiovascular: Negative for chest pain and leg swelling.   Gastrointestinal: Negative for constipation and diarrhea.   Genitourinary: Positive for vaginal bleeding. Negative for dysuria.   Psychiatric/Behavioral: Positive for decreased concentration and dysphoric mood.      Otherwise complete ROS reviewed and negative except as mentioned in the HPI.    Past Medical History:   Past Medical History:   Diagnosis Date   • ADD (attention deficit disorder)    • Breast cyst    • Endometriosis    • Gastritis, chronic    • Migraine      Past Surgical History:  Past Surgical History:   Procedure Laterality Date   • BREAST BIOPSY Right    • CHOLECYSTECTOMY     • TONSILLECTOMY     • WISDOM TOOTH EXTRACTION       Social History:  reports that she has never smoked. She has never used smokeless tobacco. She reports current alcohol use. She reports that she does not use drugs.    Family History: family history includes Breast cancer in her paternal grandmother; Colon cancer in her cousin; Ovarian cancer in her maternal aunt; Uterine cancer in her cousin and maternal aunt.       Allergies:  Allergies   Allergen Reactions   • Morphine And Related Itching and Other (See Comments)     Tried to scratch eyes out.   • Norgestimate-Eth Estradiol Nausea And Vomiting     Medications:  Prior to  "Admission medications    Medication Sig Start Date End Date Taking? Authorizing Provider   metoclopramide (REGLAN) 10 MG tablet Take 1 tablet by mouth 3 (Three) Times a Day As Needed (nausea). 9/5/17   Jaspreet Apple MD   ondansetron ODT (ZOFRAN ODT) 8 MG disintegrating tablet Take 1 tablet by mouth Every 8 (Eight) Hours As Needed for Nausea or Vomiting. 11/12/19   Tristen Paula DO   pantoprazole (PROTONIX) 40 MG EC tablet Take 1 tablet by mouth Daily. 4/14/17   Odilon Bhagat MD   promethazine (PHENERGAN) 25 MG suppository Insert 1 suppository into the rectum Every 6 (Six) Hours As Needed for Nausea. 9/5/17   Jaspreet Apple MD   sucralfate (CARAFATE) 1 G tablet Take 1 g by mouth 4 (Four) Times a Day.    Provider, MD Arnold       Objective     Vital Signs: /79 (BP Location: Right arm, Patient Position: Sitting, Cuff Size: Adult)   Pulse 97   Temp 98.7 °F (37.1 °C) (Temporal)   Ht 157.5 cm (62\")   Wt 84.6 kg (186 lb 6.4 oz)   LMP 09/20/2022   SpO2 99%   Breastfeeding No   BMI 34.09 kg/m²   Physical Exam  Vitals reviewed.   Constitutional:       Appearance: Normal appearance.   HENT:      Head: Normocephalic and atraumatic.      Nose: Nose normal.   Eyes:      Conjunctiva/sclera: Conjunctivae normal.   Cardiovascular:      Rate and Rhythm: Normal rate and regular rhythm.      Heart sounds: Normal heart sounds.   Pulmonary:      Effort: Pulmonary effort is normal.      Breath sounds: Normal breath sounds.   Abdominal:      General: Bowel sounds are normal.      Palpations: Abdomen is soft.   Musculoskeletal:         General: No tenderness.      Cervical back: Normal range of motion and neck supple.   Skin:     General: Skin is warm and dry.   Neurological:      Mental Status: She is alert.      Cranial Nerves: No cranial nerve deficit.         BMI is >= 30 and <35. (Class 1 Obesity). The following options were offered after discussion;: nutrition " counseling/recommendations      Results Reviewed:  Glucose   Date Value Ref Range Status   03/17/2017 78 70 - 100 mg/dL Final     BUN   Date Value Ref Range Status   03/17/2017 6 5 - 21 mg/dL Final     Creatinine   Date Value Ref Range Status   03/17/2017 0.60 0.50 - 1.40 mg/dL Final     Sodium   Date Value Ref Range Status   03/17/2017 135 135 - 145 mmol/L Final     Potassium   Date Value Ref Range Status   03/17/2017 4.1 3.5 - 5.3 mmol/L Final     Chloride   Date Value Ref Range Status   03/17/2017 102 98 - 110 mmol/L Final     Total CO2   Date Value Ref Range Status   03/17/2017 24.0 24.0 - 31.0 mmol/L Final     Calcium   Date Value Ref Range Status   03/17/2017 9.4 8.4 - 10.4 mg/dL Final     ALT (SGPT)   Date Value Ref Range Status   03/17/2017 18 0 - 54 U/L Final     AST (SGOT)   Date Value Ref Range Status   03/17/2017 25 7 - 45 U/L Final     WBC   Date Value Ref Range Status   02/17/2017 10.80 4.80 - 10.80 10*3/mm3 Final     Hematocrit   Date Value Ref Range Status   02/17/2017 39.4 37.0 - 47.0 % Final     Platelets   Date Value Ref Range Status   02/17/2017 298 130 - 400 10*3/mm3 Final        Assessment / Plan     Assessment/Plan:  1. Abnormal vaginal bleeding  - CBC w AUTO Differential  - US Non-ob Transvaginal    2. Attention deficit disorder (ADD) in adult  - methylphenidate (Concerta) 27 MG CR tablet; Take 1 tablet by mouth Every Morning  Dispense: 30 tablet; Refill: 0        Return in about 4 weeks (around 10/18/2022) for Recheck, Next scheduled follow up. unless patient needs to be seen sooner or acute issues arise.    Code Status: Full    I have discussed the patient results/orders and and plan/recommendation with them at today's visit.      Tristen Paula, DO   09/20/2022

## 2022-09-22 LAB
BASOPHILS # BLD AUTO: 0 X10E3/UL (ref 0–0.2)
BASOPHILS NFR BLD AUTO: 0 %
EOSINOPHIL # BLD AUTO: 0.1 X10E3/UL (ref 0–0.4)
EOSINOPHIL NFR BLD AUTO: 1 %
ERYTHROCYTE [DISTWIDTH] IN BLOOD BY AUTOMATED COUNT: 16.9 % (ref 11.7–15.4)
HCT VFR BLD AUTO: 41 % (ref 34–46.6)
HGB BLD-MCNC: 12.8 G/DL (ref 11.1–15.9)
IMM GRANULOCYTES # BLD AUTO: 0 X10E3/UL (ref 0–0.1)
IMM GRANULOCYTES NFR BLD AUTO: 0 %
LYMPHOCYTES # BLD AUTO: 2.7 X10E3/UL (ref 0.7–3.1)
LYMPHOCYTES NFR BLD AUTO: 38 %
MCH RBC QN AUTO: 28.1 PG (ref 26.6–33)
MCHC RBC AUTO-ENTMCNC: 31.2 G/DL (ref 31.5–35.7)
MCV RBC AUTO: 90 FL (ref 79–97)
MONOCYTES # BLD AUTO: 0.5 X10E3/UL (ref 0.1–0.9)
MONOCYTES NFR BLD AUTO: 7 %
NEUTROPHILS # BLD AUTO: 3.8 X10E3/UL (ref 1.4–7)
NEUTROPHILS NFR BLD AUTO: 54 %
PLATELET # BLD AUTO: 316 X10E3/UL (ref 150–450)
RBC # BLD AUTO: 4.56 X10E6/UL (ref 3.77–5.28)
T4 FREE SERPL-MCNC: 1.33 NG/DL (ref 0.82–1.77)
TSH SERPL DL<=0.005 MIU/L-ACNC: 1.74 UIU/ML (ref 0.45–4.5)
WBC # BLD AUTO: 7.1 X10E3/UL (ref 3.4–10.8)

## 2022-11-01 ENCOUNTER — OFFICE VISIT (OUTPATIENT)
Dept: INTERNAL MEDICINE | Facility: CLINIC | Age: 32
End: 2022-11-01

## 2022-11-01 DIAGNOSIS — R74.8 ABNORMAL LIVER ENZYMES: ICD-10-CM

## 2022-11-01 DIAGNOSIS — R25.2 LEG CRAMPS: Primary | ICD-10-CM

## 2022-11-01 DIAGNOSIS — F98.8 ATTENTION DEFICIT DISORDER (ADD) IN ADULT: ICD-10-CM

## 2022-11-01 PROCEDURE — 99214 OFFICE O/P EST MOD 30 MIN: CPT | Performed by: INTERNAL MEDICINE

## 2022-11-01 RX ORDER — DEXTROAMPHETAMINE SACCHARATE, AMPHETAMINE ASPARTATE MONOHYDRATE, DEXTROAMPHETAMINE SULFATE AND AMPHETAMINE SULFATE 7.5; 7.5; 7.5; 7.5 MG/1; MG/1; MG/1; MG/1
30 CAPSULE, EXTENDED RELEASE ORAL EVERY MORNING
Qty: 30 CAPSULE | Refills: 0 | Status: SHIPPED | OUTPATIENT
Start: 2022-11-01 | End: 2022-12-27 | Stop reason: SDUPTHER

## 2022-11-01 NOTE — PROGRESS NOTES
Subjective     ADD. Left calf pain.     History of Present Illness  Left calf pain. Hurting all the time. If she contracts her leg the calf muscle is very painful.     She is worried about her liver. She states that why she was pregnant her liver looked bad.     Patient states that the Concerta is not strong enough. She has taken Adderall in the past and would like to change.     Patient's PMR from outside medical facility reviewed and noted.    Review of Systems   Constitutional: Negative for chills and fever.   HENT: Negative for congestion and rhinorrhea.    Respiratory: Negative for cough and shortness of breath.    Cardiovascular: Negative for chest pain and palpitations.   Gastrointestinal: Negative for constipation and diarrhea.   Genitourinary: Negative for dysuria and hematuria.   Musculoskeletal: Positive for arthralgias.        Calf pain      Otherwise complete ROS reviewed and negative except as mentioned in the HPI.    Past Medical History:   Past Medical History:   Diagnosis Date   • ADD (attention deficit disorder)    • Breast cyst    • Endometriosis    • Gastritis, chronic    • Migraine      Past Surgical History:  Past Surgical History:   Procedure Laterality Date   • BREAST BIOPSY Right    • CHOLECYSTECTOMY     • TONSILLECTOMY     • WISDOM TOOTH EXTRACTION       Social History:  reports that she has never smoked. She has never used smokeless tobacco. She reports current alcohol use. She reports that she does not use drugs.    Family History: family history includes Breast cancer in her paternal grandmother; Colon cancer in her cousin; Ovarian cancer in her maternal aunt; Uterine cancer in her cousin and maternal aunt.      Allergies:  Allergies   Allergen Reactions   • Morphine And Related Itching and Other (See Comments)     Tried to scratch eyes out.   • Norgestimate-Eth Estradiol Nausea And Vomiting     Medications:  Prior to Admission medications    Medication Sig Start Date End Date  Taking? Authorizing Provider   methylphenidate (Concerta) 27 MG CR tablet Take 1 tablet by mouth Every Morning 9/20/22   Tristen Paula DO   metoclopramide (REGLAN) 10 MG tablet Take 1 tablet by mouth 3 (Three) Times a Day As Needed (nausea). 9/5/17   Jaspreet Apple MD   ondansetron ODT (ZOFRAN ODT) 8 MG disintegrating tablet Take 1 tablet by mouth Every 8 (Eight) Hours As Needed for Nausea or Vomiting. 11/12/19   Tristen Paula DO   pantoprazole (PROTONIX) 40 MG EC tablet Take 1 tablet by mouth Daily. 4/14/17   Odilon Bhagat MD   promethazine (PHENERGAN) 25 MG suppository Insert 1 suppository into the rectum Every 6 (Six) Hours As Needed for Nausea. 9/5/17   Jaspreet Apple MD   sucralfate (CARAFATE) 1 G tablet Take 1 g by mouth 4 (Four) Times a Day.    Provider, MD Arnold       Objective     Vital Signs: There were no vitals taken for this visit.  Physical Exam  Vitals reviewed.   Constitutional:       Appearance: Normal appearance.   HENT:      Head: Normocephalic and atraumatic.      Right Ear: External ear normal.      Left Ear: External ear normal.      Nose: Nose normal.   Eyes:      General: No scleral icterus.     Conjunctiva/sclera: Conjunctivae normal.   Cardiovascular:      Rate and Rhythm: Normal rate and regular rhythm.   Pulmonary:      Effort: Pulmonary effort is normal. No respiratory distress.   Musculoskeletal:         General: No swelling or tenderness.      Cervical back: Normal range of motion and neck supple.   Skin:     General: Skin is warm and dry.   Neurological:      General: No focal deficit present.      Mental Status: She is alert.      Cranial Nerves: No cranial nerve deficit.   Psychiatric:         Mood and Affect: Mood normal.         Behavior: Behavior normal.       BMI is >= 30 and <35. (Class 1 Obesity). The following options were offered after discussion;: nutrition counseling/recommendations      Results Reviewed:  Glucose   Date Value Ref Range  Status   03/17/2017 78 70 - 100 mg/dL Final     BUN   Date Value Ref Range Status   03/17/2017 6 5 - 21 mg/dL Final     Creatinine   Date Value Ref Range Status   03/17/2017 0.60 0.50 - 1.40 mg/dL Final     Sodium   Date Value Ref Range Status   03/17/2017 135 135 - 145 mmol/L Final     Potassium   Date Value Ref Range Status   03/17/2017 4.1 3.5 - 5.3 mmol/L Final     Chloride   Date Value Ref Range Status   03/17/2017 102 98 - 110 mmol/L Final     Total CO2   Date Value Ref Range Status   03/17/2017 24.0 24.0 - 31.0 mmol/L Final     Calcium   Date Value Ref Range Status   03/17/2017 9.4 8.4 - 10.4 mg/dL Final     ALT (SGPT)   Date Value Ref Range Status   03/17/2017 18 0 - 54 U/L Final     AST (SGOT)   Date Value Ref Range Status   03/17/2017 25 7 - 45 U/L Final     WBC   Date Value Ref Range Status   09/20/2022 7.1 3.4 - 10.8 x10E3/uL Final     Hematocrit   Date Value Ref Range Status   09/20/2022 41.0 34.0 - 46.6 % Final     Platelets   Date Value Ref Range Status   09/20/2022 316 150 - 450 x10E3/uL Final       Assessment / Plan     Assessment/Plan:  1. Leg cramps  - D-dimer, Quantitative  - Magnesium    2. Abnormal liver enzymes  - Comprehensive metabolic panel    3. Attention deficit disorder (ADD) in adult  - DC Concerta   - amphetamine-dextroamphetamine XR (Adderall XR) 30 MG 24 hr capsule; Take 1 capsule by mouth Every Morning  Dispense: 30 capsule; Refill: 0        Return in about 3 months (around 2/1/2023) for Recheck, Next scheduled follow up. unless patient needs to be seen sooner or acute issues arise.    Code Status: Full    I have discussed the patient results/orders and and plan/recommendation with them at today's visit.      Tristen Paula,    11/01/2022

## 2022-11-03 LAB
ALBUMIN SERPL-MCNC: 4.3 G/DL (ref 3.8–4.8)
ALBUMIN/GLOB SERPL: 1.9 {RATIO} (ref 1.2–2.2)
ALP SERPL-CCNC: 103 IU/L (ref 44–121)
ALT SERPL-CCNC: 14 IU/L (ref 0–32)
AST SERPL-CCNC: 13 IU/L (ref 0–40)
BILIRUB SERPL-MCNC: <0.2 MG/DL (ref 0–1.2)
BUN SERPL-MCNC: 9 MG/DL (ref 6–20)
BUN/CREAT SERPL: 11 (ref 9–23)
CALCIUM SERPL-MCNC: 9.2 MG/DL (ref 8.7–10.2)
CHLORIDE SERPL-SCNC: 104 MMOL/L (ref 96–106)
CO2 SERPL-SCNC: 24 MMOL/L (ref 20–29)
CREAT SERPL-MCNC: 0.79 MG/DL (ref 0.57–1)
D DIMER PPP FEU-MCNC: <0.2 MG/L FEU (ref 0–0.49)
EGFRCR SERPLBLD CKD-EPI 2021: 101 ML/MIN/1.73
GLOBULIN SER CALC-MCNC: 2.3 G/DL (ref 1.5–4.5)
GLUCOSE SERPL-MCNC: 84 MG/DL (ref 70–99)
Lab: NORMAL
MAGNESIUM SERPL-MCNC: 2.1 MG/DL (ref 1.6–2.3)
POTASSIUM SERPL-SCNC: 3.8 MMOL/L (ref 3.5–5.2)
PROT SERPL-MCNC: 6.6 G/DL (ref 6–8.5)
SODIUM SERPL-SCNC: 140 MMOL/L (ref 134–144)

## 2022-12-27 DIAGNOSIS — F98.8 ATTENTION DEFICIT DISORDER (ADD) IN ADULT: ICD-10-CM

## 2022-12-27 RX ORDER — DEXTROAMPHETAMINE SACCHARATE, AMPHETAMINE ASPARTATE MONOHYDRATE, DEXTROAMPHETAMINE SULFATE AND AMPHETAMINE SULFATE 7.5; 7.5; 7.5; 7.5 MG/1; MG/1; MG/1; MG/1
30 CAPSULE, EXTENDED RELEASE ORAL EVERY MORNING
Qty: 30 CAPSULE | Refills: 0 | Status: SHIPPED | OUTPATIENT
Start: 2022-12-27 | End: 2023-02-22 | Stop reason: SDUPTHER

## 2023-01-25 ENCOUNTER — TRANSCRIBE ORDERS (OUTPATIENT)
Dept: PHYSICAL THERAPY | Facility: HOSPITAL | Age: 33
End: 2023-01-25
Payer: COMMERCIAL

## 2023-01-25 DIAGNOSIS — N94.2 VAGINISMUS: Primary | ICD-10-CM

## 2023-02-22 ENCOUNTER — OFFICE VISIT (OUTPATIENT)
Dept: INTERNAL MEDICINE | Facility: CLINIC | Age: 33
End: 2023-02-22
Payer: COMMERCIAL

## 2023-02-22 VITALS
DIASTOLIC BLOOD PRESSURE: 80 MMHG | SYSTOLIC BLOOD PRESSURE: 116 MMHG | OXYGEN SATURATION: 98 % | BODY MASS INDEX: 30.36 KG/M2 | WEIGHT: 165 LBS | TEMPERATURE: 98.7 F | HEART RATE: 97 BPM | HEIGHT: 62 IN

## 2023-02-22 DIAGNOSIS — J01.01 ACUTE RECURRENT MAXILLARY SINUSITIS: ICD-10-CM

## 2023-02-22 DIAGNOSIS — M79.2 NERVE PAIN: ICD-10-CM

## 2023-02-22 DIAGNOSIS — F98.8 ATTENTION DEFICIT DISORDER (ADD) IN ADULT: ICD-10-CM

## 2023-02-22 DIAGNOSIS — Z79.899 LONG TERM USE OF DRUG: Primary | ICD-10-CM

## 2023-02-22 DIAGNOSIS — B99.9 RECURRENT INFECTIONS: ICD-10-CM

## 2023-02-22 DIAGNOSIS — R53.83 OTHER FATIGUE: ICD-10-CM

## 2023-02-22 PROCEDURE — 99214 OFFICE O/P EST MOD 30 MIN: CPT | Performed by: INTERNAL MEDICINE

## 2023-02-22 RX ORDER — DEXTROAMPHETAMINE SACCHARATE, AMPHETAMINE ASPARTATE MONOHYDRATE, DEXTROAMPHETAMINE SULFATE AND AMPHETAMINE SULFATE 7.5; 7.5; 7.5; 7.5 MG/1; MG/1; MG/1; MG/1
30 CAPSULE, EXTENDED RELEASE ORAL EVERY MORNING
Qty: 90 CAPSULE | Refills: 0 | Status: SHIPPED | OUTPATIENT
Start: 2023-02-22 | End: 2023-03-06 | Stop reason: SDUPTHER

## 2023-02-22 RX ORDER — LEVOFLOXACIN 500 MG/1
500 TABLET, FILM COATED ORAL DAILY
Qty: 7 TABLET | Refills: 0 | Status: SHIPPED | OUTPATIENT
Start: 2023-02-22

## 2023-02-22 RX ORDER — PREGABALIN 25 MG/1
25 CAPSULE ORAL DAILY PRN
Qty: 20 CAPSULE | Refills: 1 | Status: SHIPPED | OUTPATIENT
Start: 2023-02-22

## 2023-02-22 RX ORDER — TIZANIDINE 4 MG/1
1 TABLET ORAL EVERY 12 HOURS SCHEDULED
COMMUNITY
Start: 2023-01-24

## 2023-02-22 NOTE — PROGRESS NOTES
Subjective     Chief Complaint   Patient presents with   • Fatigue   • female problems       History of Present Illness  She is taking allergy medication.   States that she gets sick a lot. And feels like she has been on antibiotics for a long time since having the baby.   She feels like if she takes some immune support medications she gets worse.     She is worried about her blood count. States that she had it doen at her last OB appointment at [facility]. HBG was 13.3 and HCT was 85.8.     She has seen the GYN an has a transvaginal study ordered.     She needs a refill on her Adderall. She states that she has not had any side effects and is doing well on her medications.     Patient's PMR from outside medical facility reviewed and noted.    Review of Systems   HENT: Positive for congestion, ear pain and sore throat. Negative for drooling, ear discharge and trouble swallowing.    Respiratory: Positive for cough. Negative for shortness of breath and stridor.    Gastrointestinal: Negative for abdominal pain, diarrhea and vomiting.   Musculoskeletal: Positive for neck pain.   Neurological: Positive for headaches.      Otherwise complete ROS reviewed and negative except as mentioned in the HPI.    Past Medical History:   Past Medical History:   Diagnosis Date   • ADD (attention deficit disorder)    • Allergic     Seasonal   • Anemia     When pregnant, along with the year following.   • Anxiety    • Breast cyst    • Colon polyp    • Depression    • Endometriosis    • Gastritis, chronic    • GERD (gastroesophageal reflux disease)     Gastritis   • Migraine      Past Surgical History:  Past Surgical History:   Procedure Laterality Date   • BREAST BIOPSY Right    • CHOLECYSTECTOMY     • COLONOSCOPY      Had 2-3 times in last decade.   • ENDOMETRIAL ABLATION      Age of 22   • HERNIA REPAIR      2 times   • TONSILLECTOMY     • UMBILICAL HERNIA REPAIR      Age of 10 or 11   • WISDOM TOOTH EXTRACTION       Social  History:  reports that she has never smoked. She has never used smokeless tobacco. She reports current alcohol use. She reports that she does not use drugs.    Family History: family history includes Arthritis in her maternal grandfather and maternal grandmother; Breast cancer in her paternal grandmother; Cancer in her maternal grandfather and paternal grandmother; Colon cancer in her cousin; Depression in her brother; Diabetes in her paternal grandmother; Early death in her paternal grandmother; Hearing loss in her maternal grandmother; Heart disease in her maternal grandfather and paternal grandfather; Hyperlipidemia in her maternal grandfather, maternal grandmother, mother, and paternal grandfather; Miscarriages / Stillbirths in her maternal grandmother and mother; Ovarian cancer in her maternal aunt; Uterine cancer in her cousin and maternal aunt.      Allergies:  Allergies   Allergen Reactions   • Morphine And Related Itching and Other (See Comments)     Tried to scratch eyes out.   • Norgestimate-Eth Estradiol Nausea And Vomiting     Medications:  Prior to Admission medications    Medication Sig Start Date End Date Taking? Authorizing Provider   amphetamine-dextroamphetamine XR (Adderall XR) 30 MG 24 hr capsule Take 1 capsule by mouth Every Morning 12/27/22   Tristen Paula,    metoclopramide (REGLAN) 10 MG tablet Take 1 tablet by mouth 3 (Three) Times a Day As Needed (nausea). 9/5/17   Jaspreet Apple MD   spironolactone (ALDACTONE) 2.5 mg/ml 2.5 mg/ml oral suspension compounded medication   Estradiol 0.5 mg / g + 5% lidocaine compounded in hydrophilic petrolatum.   Apply to affected area twice daily.    Arnold Bradley MD   tiZANidine (ZANAFLEX) 4 MG tablet Take 1 tablet by mouth Every 12 (Twelve) Hours. 1/24/23   Arnold Bradley MD   ondansetron ODT (ZOFRAN ODT) 8 MG disintegrating tablet Take 1 tablet by mouth Every 8 (Eight) Hours As Needed for Nausea or Vomiting. 11/12/19 2/22/23   "Tristen Paula,    pantoprazole (PROTONIX) 40 MG EC tablet Take 1 tablet by mouth Daily. 4/14/17 2/22/23  Odilon Bhagat MD   promethazine (PHENERGAN) 25 MG suppository Insert 1 suppository into the rectum Every 6 (Six) Hours As Needed for Nausea. 9/5/17 2/22/23  Jaspreet Apple MD   sucralfate (CARAFATE) 1 G tablet Take 1 g by mouth 4 (Four) Times a Day.  2/22/23  Provider, MD Arnold       Objective     Vital Signs: /80 (BP Location: Left arm, Patient Position: Sitting, Cuff Size: Adult)   Pulse 97   Temp 98.7 °F (37.1 °C) (Infrared)   Ht 157.5 cm (62\")   Wt 74.8 kg (165 lb)   SpO2 98%   BMI 30.18 kg/m²   Physical Exam  Vitals reviewed.   Constitutional:       Appearance: Normal appearance.   HENT:      Head: Normocephalic and atraumatic.      Right Ear: External ear normal.      Left Ear: External ear normal.      Nose: Congestion present.      Mouth/Throat:      Mouth: Mucous membranes are moist.      Pharynx: No oropharyngeal exudate.   Eyes:      General: No scleral icterus.     Conjunctiva/sclera: Conjunctivae normal.   Cardiovascular:      Rate and Rhythm: Normal rate and regular rhythm.   Pulmonary:      Effort: Pulmonary effort is normal. No respiratory distress.   Musculoskeletal:         General: No swelling or tenderness.      Cervical back: Normal range of motion and neck supple.   Skin:     General: Skin is warm and dry.   Neurological:      General: No focal deficit present.      Mental Status: She is alert.      Cranial Nerves: No cranial nerve deficit.   Psychiatric:         Mood and Affect: Mood normal.         Behavior: Behavior normal.         BMI is >= 30 and <35. (Class 1 Obesity). The following options were offered after discussion;: nutrition counseling/recommendations      Results Reviewed:  Glucose   Date Value Ref Range Status   11/01/2022 84 70 - 99 mg/dL Final     BUN   Date Value Ref Range Status   11/01/2022 9 6 - 20 mg/dL Final     Creatinine   Date " Value Ref Range Status   11/01/2022 0.79 0.57 - 1.00 mg/dL Final     Sodium   Date Value Ref Range Status   11/01/2022 140 134 - 144 mmol/L Final     Potassium   Date Value Ref Range Status   11/01/2022 3.8 3.5 - 5.2 mmol/L Final     Chloride   Date Value Ref Range Status   11/01/2022 104 96 - 106 mmol/L Final     Total CO2   Date Value Ref Range Status   11/01/2022 24 20 - 29 mmol/L Final     Calcium   Date Value Ref Range Status   11/01/2022 9.2 8.7 - 10.2 mg/dL Final     ALT (SGPT)   Date Value Ref Range Status   11/01/2022 14 0 - 32 IU/L Final     AST (SGOT)   Date Value Ref Range Status   11/01/2022 13 0 - 40 IU/L Final     WBC   Date Value Ref Range Status   09/20/2022 7.1 3.4 - 10.8 x10E3/uL Final     Hematocrit   Date Value Ref Range Status   09/20/2022 41.0 34.0 - 46.6 % Final     Platelets   Date Value Ref Range Status   09/20/2022 316 150 - 450 x10E3/uL Final       Assessment / Plan     Assessment/Plan:  1. Long term use of drug  - Pain Management Profile (13 Drugs) Urine - Urine, Clean Catch    2. Acute recurrent maxillary sinusitis  - levoFLOXacin (Levaquin) 500 MG tablet; Take 1 tablet by mouth Daily.  Dispense: 7 tablet; Refill: 0    3. Attention deficit disorder (ADD) in adult  - amphetamine-dextroamphetamine XR (Adderall XR) 30 MG 24 hr capsule; Take 1 capsule by mouth Every Morning  Dispense: 90 capsule; Refill: 0    4. Recurrent infections  - CBC w AUTO Differential  - KATHERINE  - IgG, IgA, IgM    5. Question PGAD  - Will try Lyrica for nerve pain.     B12 and Vitamin D    Return in about 3 months (around 5/22/2023) for Recheck, Next scheduled follow up. unless patient needs to be seen sooner or acute issues arise.    Code Status: Full    I have discussed the patient results/orders and and plan/recommendation with them at today's visit.      Tristen Paula, DO   02/22/2023        Answers for HPI/ROS submitted by the patient on 2/21/2023  What is the primary reason for your visit?: Sore Throat

## 2023-02-24 LAB
AMPHETAMINES UR QL SCN: NEGATIVE NG/ML
ANA SER QL: NEGATIVE
BARBITURATES UR QL SCN: NEGATIVE NG/ML
BASOPHILS # BLD AUTO: 0.1 X10E3/UL (ref 0–0.2)
BASOPHILS NFR BLD AUTO: 1 %
BENZODIAZ UR QL SCN: NEGATIVE NG/ML
BZE UR QL SCN: NEGATIVE NG/ML
CANNABINOIDS UR QL SCN: NEGATIVE NG/ML
CREAT UR-MCNC: 21 MG/DL (ref 20–300)
EOSINOPHIL # BLD AUTO: 0.2 X10E3/UL (ref 0–0.4)
EOSINOPHIL NFR BLD AUTO: 2 %
ERYTHROCYTE [DISTWIDTH] IN BLOOD BY AUTOMATED COUNT: 15 % (ref 11.7–15.4)
FENTANYL UR-MCNC: NEGATIVE PG/ML
HCT VFR BLD AUTO: 41.6 % (ref 34–46.6)
HGB BLD-MCNC: 13.2 G/DL (ref 11.1–15.9)
IGA SERPL-MCNC: 181 MG/DL (ref 87–352)
IGG SERPL-MCNC: 931 MG/DL (ref 586–1602)
IGM SERPL-MCNC: 104 MG/DL (ref 26–217)
IMM GRANULOCYTES # BLD AUTO: 0 X10E3/UL (ref 0–0.1)
IMM GRANULOCYTES NFR BLD AUTO: 0 %
LABORATORY COMMENT REPORT: NORMAL
LYMPHOCYTES # BLD AUTO: 2.9 X10E3/UL (ref 0.7–3.1)
LYMPHOCYTES NFR BLD AUTO: 35 %
MCH RBC QN AUTO: 28.9 PG (ref 26.6–33)
MCHC RBC AUTO-ENTMCNC: 31.7 G/DL (ref 31.5–35.7)
MCV RBC AUTO: 91 FL (ref 79–97)
MEPERIDINE UR QL: NEGATIVE NG/ML
METHADONE UR QL SCN: NEGATIVE NG/ML
MONOCYTES # BLD AUTO: 0.6 X10E3/UL (ref 0.1–0.9)
MONOCYTES NFR BLD AUTO: 7 %
NEUTROPHILS # BLD AUTO: 4.5 X10E3/UL (ref 1.4–7)
NEUTROPHILS NFR BLD AUTO: 55 %
OPIATES UR QL SCN: NEGATIVE NG/ML
OXYCODONE+OXYMORPHONE UR QL SCN: NEGATIVE NG/ML
PCP UR QL: NEGATIVE NG/ML
PH UR: 6.7 [PH] (ref 4.5–8.9)
PLATELET # BLD AUTO: 279 X10E3/UL (ref 150–450)
PROPOXYPH UR QL SCN: NEGATIVE NG/ML
RBC # BLD AUTO: 4.57 X10E6/UL (ref 3.77–5.28)
SP GR UR: 1
TRAMADOL UR QL SCN: NEGATIVE NG/ML
WBC # BLD AUTO: 8.2 X10E3/UL (ref 3.4–10.8)

## 2023-03-06 DIAGNOSIS — F98.8 ATTENTION DEFICIT DISORDER (ADD) IN ADULT: ICD-10-CM

## 2023-03-06 RX ORDER — DEXTROAMPHETAMINE SACCHARATE, AMPHETAMINE ASPARTATE MONOHYDRATE, DEXTROAMPHETAMINE SULFATE AND AMPHETAMINE SULFATE 7.5; 7.5; 7.5; 7.5 MG/1; MG/1; MG/1; MG/1
30 CAPSULE, EXTENDED RELEASE ORAL EVERY MORNING
Qty: 90 CAPSULE | Refills: 0 | Status: SHIPPED | OUTPATIENT
Start: 2023-03-06

## 2023-03-08 ENCOUNTER — TELEPHONE (OUTPATIENT)
Dept: INTERNAL MEDICINE | Facility: CLINIC | Age: 33
End: 2023-03-08
Payer: COMMERCIAL

## 2023-03-08 NOTE — TELEPHONE ENCOUNTER
PT tried picking up meds from [facility] in [place] last night and the Pharmacist, Jerry called to ask if she needed a new prescription since this medication was filled on 02/22/2023 and had not been picked up.     I looked in PT chart and appears she had a refill sent on 03/06/2022 to [facility] in [place].    [facility] Pharmacist, Jerry's phone number is [phone].

## 2023-04-03 ENCOUNTER — HOSPITAL ENCOUNTER (OUTPATIENT)
Dept: PHYSICAL THERAPY | Facility: HOSPITAL | Age: 33
Setting detail: THERAPIES SERIES
Discharge: HOME | End: 2023-04-03
Payer: COMMERCIAL

## 2023-04-03 DIAGNOSIS — N94.2 VAGINISMUS: Primary | ICD-10-CM

## 2023-04-03 PROCEDURE — 97162 PT EVAL MOD COMPLEX 30 MIN: CPT

## 2023-04-03 NOTE — THERAPY EVALUATION
Outpatient Physical Therapy Pelvic Health Initial Evaluation  [facility]     Patient Name: Mildred Li  : 1990  MRN: [id number]  Today's Date: 4/3/2023    Patient seen for 1 PT sessions.  Patient reports N/A% of improvement.  Next MD appt: lisa/prn  Recertification: 2023      Therapy Diagnosis: Pelvic pain       Visit Date: 2023    Patient Active Problem List   Diagnosis   • Positive GBS test        Past Medical History:   Diagnosis Date   • ADD (attention deficit disorder)    • Allergic     Seasonal   • Anemia     When pregnant, along with the year following.   • Anxiety    • Breast cyst    • Colon polyp    • Depression    • Endometriosis    • Gastritis, chronic    • GERD (gastroesophageal reflux disease)     Gastritis   • Migraine         Past Surgical History:   Procedure Laterality Date   • BREAST BIOPSY Right    • CHOLECYSTECTOMY     • COLONOSCOPY      Had 2-3 times in last decade.   • ENDOMETRIAL ABLATION      Age of 22   • HERNIA REPAIR      2 times   • TONSILLECTOMY     • UMBILICAL HERNIA REPAIR      Age of 10 or 11   • WISDOM TOOTH EXTRACTION           Visit Dx:    ICD-10-CM ICD-9-CM   1. Vaginismus  N94.2 625.1            Pelvic Health     Row Name 23 1400 23 1300          Subjective Comments    Subjective Comments Patient reports she had vaginal pain and dyspaurenia after giving birth to daughter (). Some bleeding after she had her daughter for a couple of months. Went on different types of birth control. Mini pill, neuva ring (made cervix fall down 2 inches). After taking her neuvo ring out she started having extreme pain. Sometimes has crippling pain with periods. Extremely heavy flows. Extreme stress causes increased pain. Pain with intercourse the whole time. Pain with foreplay. Pain can be sharp and sometimes crampy. Pain is in different areas. No pain in vaginal area prior to child delivery. Marinoff scale 3. Not on birth control right now. No  estrogen medications. Lyrica for nerve pain. Muscle relaxers prescribed. Clitoral stimulation appears to be a trigger. Everything seems to be more hypersensitive. Appears pain to be all the time. Patient empties bladder ~ 6x/day. No pain with urination. Hydration: within 1 hour 3x 160z water; juice and coffee. Bowel movements 1x/day. [place] stool scale type 4. Denies radicular symptoms. Patient denies leakage. Pain lasts after intercourse for at least a couple of days. Patient reports pain with every sexual position.  -AC --        Pregnancy Questions    Number of Pregnancies 2  -AC --     Number of Miscarriages 0  -AC --     Has the patient had an ? No  -AC --     Number of Children 2  -AC --     Type of Previous Deliveries Vaginal  -AC --     Pregnancy Comments 2 tears with delivery, episiotomies  -AC --        Pain Assessment    Pain Assessment No/denies pain  -AC --        Lumbar/SI Special Tests    Lumbar/SI Special Tests Comments -- deferred this visit  -AC        Lumbosacral Palpation    Lumbosacral Palpation Comments -- deferred this visit  -AC        Pelvic Floor Muscle    Patient/Parent/Guardian Consented to Internal Pelvic Floor Exam -- Yes  -AC     Strength (Right) -- 3: Squeeze with/without lift  -AC     Strength (Left) -- 3: Squeeze with/without lift  -AC     Symmetry of Sustained Maximal Contraction -- Symmetrical  -AC     Endurance (Ability to Hold Maximal Contraction) -- 10 sec  -AC     # of Reps of Maximal Contractions while Maintaining Endurance and Strength -- 2 sets  -AC     Fast Contraction (# of 1 sec contractions performed) -- 10  -AC     Internal Pelvic Floor Comments -- Good contraction present of PF with squeeze and lift present. Orland's glands present. TTP of posterior fourchette, B superficial and deep transverse perineum R >L. TTP also of B obturator internus (R>L), and R levator ani. POP with ant wall laxity noted at stage I.  Mild ant translation noted with Valsalva but  "remains stage I.  -AC     External Pelvic Floor Comments -- Hair present over vulvar area. Light pink pigmentation noted inside vulva. Q-tip test for vulvodynia performed with no significant findings. No hypersensitivity noted upon palpation of external structures. No tenderness noted of external structures. Formal abdominal assessment next visit.  -AC        Observations    Perineal Observation Performed? -- Yes  -AC     Posture Observations -- No acute distress. Well developed. Alert and oriented. Good historian. Normal gait. I with all transfers.  -AC        Observation of Contraction in Perineum    Anal McKee -- Present  -AC     Perineal Body Lift -- Present  -AC     Clitoral Chong -- Present  -AC     Bears Down with Contraction -- Absent  -AC        Pelvic Floor    Ability to Isolate Contraction of Pelvic Floor -- No  -AC     Overflow from Adjacent Muscles -- Upper Abdominals;Gluteus Brandin  -AC        Cough    Abdominal Contraction -- Yes  -AC     Leak -- None  -AC        Prolapse (Pop-Q)    Cystocele -- Stage I  -AC     Rectocele -- Stage 0  -AC     Urethrocele -- Stage I  -AC     Uterine Prolapse -- Stage 0  -AC        SEMG Evaluation    SEMG Evaluation Comments -- deferred this visit.  -AC        FSFI Desire    Over the last 4 weeks, how often did you feel sexual desire or interest -- 5  -AC     Over the past 4 weeks, how would you rate your level (degree) of sexual desire or interest -- 4  -AC     Desire Total -- 5.4  -AC        FSFI Arousal    Over the past 4 weeks, how often did you feel sexually aroused (\"turned on\") during sexual activity or intercourse? -- 4  -AC     Over the past 4 weeks, how would you rate your level of sexual arousal (\"turned on\") during sexual activity or intercourse? -- 3  -AC     Over the past 4 weeks, how confident were you about becoming sexually aroused during sexual activity or intercourse? -- 3  -AC     Over the past 4 weeks, how often have you been satisfied with your " "arousal (excitement) during sexual activity or intercourse? -- 1  -AC     Arousal Total -- 3.3  -AC        FSFI Lubrication    Over the past 4 weeks, how often did you become lubricated (\"wet\") during sexual activity or intercourse -- 5  -AC     Over the past 4 weeks, how difficult was it to become lubricated (\"wet\") during sexual activity or intercourse? -- 5  -AC     Over the past 4 weeks, how often did you maintain lubrication (\"wetness\") during sexual activity or intercourse -- 1  -AC     Over the past 4 weeks, how difficult was it to maintain your lubrication (\"wetness\") until completion of sexual activity or intercourse? -- 2  -AC     Lubrication Total -- 3.9  -AC        FSFI Orgasm    Over the past 4 weeks, when you had sexual stimulation or intercourse, how often did you reach orgasm (climax)? -- 1  -AC     Over the past 4 weeks, when you had sexual stimulation or intercourse, how difficult was it for you to reach orgasm (climax)? -- 1  -AC     Over the past 4 weeks, how satisfied were you with your ability to reach orgasm (climax) during sexual activity or intercourse? -- 1  -AC     Orgasm Total -- 1.2  -AC        FSFI Satisfaction    Over the past 4 weeks, how satisfied have you been with the amount of emotional closeness during sexual activity between you and your partner -- 5  -AC     Over the past 4 weeks, how satisfied have you been with your sexual relationship with your partner? -- 5  -AC     Over the past 4 weeks, how satisfied have you been with your overall sex life? -- 2  -AC     Satisfaction Total -- 4.8  -AC        FSFI Pain    Over the past 4 weeks, how often did you experience discomfort or pain during vaginal penetration -- 1  -AC     Over the past 4 weeks, how often did you experience discomfort or pain following vaginal penetration -- 1  -AC     Over the past 4 weeks, how would you rate your level (degree) of discomfort or pain during or following vaginal penetration? -- 1  -AC     Pain " Total -- 1.2  -     Full Scale Score -- 19.8  -AC        General ROM    GENERAL ROM COMMENTS -- B UE and LE AROM WFL.  -        MMT (Manual Muscle Testing)    General MMT Comments -- functional LE/UE MMT for functional transfers and mobility.  -AC           User Key  (r) = Recorded By, (t) = Taken By, (c) = Cosigned By    Initials Name Provider Type     Cyn Gee, PT Physical Therapist               PT Ortho     Row Name 04/03/23 1400       Precautions and Contraindications    Precautions/Limitations no known precautions/limitations  -          User Key  (r) = Recorded By, (t) = Taken By, (c) = Cosigned By    Initials Name Provider Type     Cyn Gee, PT Physical Therapist                            PT Assessment/Plan     Row Name 04/03/23 1300          PT Assessment    Functional Limitations Performance in leisure activities;Performance in work activities;Limitations in community activities  -     Impairments Coordination;Pain;Muscle strength;Impaired muscle endurance;Posture;Impaired flexibility  -     Assessment Comments Patient is a 32 y.o. female who presents to PT with c/o dyspareunia and vaginal pain that is chronic in nature. Q-tip test negative for vulvodynia. Patient presents with myofascial restrictions and tenderness in intravaginal cuff and B obturator internus. Patient also presents with decreased PF coordination. Slightly elevated tone also noted upon examination. Patient would benefit from skilled PT to address dysfunction and promote downtraining of PF to improve overall sexual intimacy. Patient insurance does not allow tx to begin on initial eval. A small HEP was established.  -AC     Please refer to paper survey for additional self-reported information Yes  -AC     Rehab Potential Good  -AC     Patient/caregiver participated in establishment of treatment plan and goals No  -AC     Patient would benefit from skilled therapy intervention Yes  -AC        PT Plan    PT  Frequency 2x/week;1x/week  -     Predicted Duration of Therapy Intervention (PT) 3-4 months  -     Planned CPT's? PT EVAL MOD COMPLELITY: 36517;PT RE-EVAL: 37800;PT THER PROC EA 15 MIN: 45040;PT THER ACT EA 15 MIN: 90161;PT MANUAL THERAPY EA 15 MIN: 94647;PT NEUROMUSC RE-EDUCATION EA 15 MIN: 96400;PT SELF CARE/HOME MGMT/TRAIN EA 15: 59097;PT HOT OR COLD PACK TREAT [facility];PT SELF CARE/MGMT/TRAIN 15 MIN: 48547;PT THER SUPP EA 15 MIN  -     PT Plan Comments Progress overall downtraining of PF. Next visit teach PF drop, and continue end pelvic pain routine. Discuss cryotherapy for pain relief. Begin SEMG.  -           User Key  (r) = Recorded By, (t) = Taken By, (c) = Cosigned By    Initials Name Provider Type     Cyn Gee, PT Physical Therapist                   OP Exercises     Row Name 04/03/23 1400             Subjective Comments    Subjective Comments Patient reports she had vaginal pain and dyspaurenia after giving birth to daughter (June 28th). Some bleeding after she had her daughter for a couple of months. Went on different types of birth control. Mini pill, neuva ring (made cervix fall down 2 inches). After taking her neuvo ring out she started having extreme pain. Sometimes has crippling pain with periods. Extremely heavy flows. Extreme stress causes increased pain. Pain with intercourse the whole time. Pain with foreplay. Pain can be sharp and sometimes crampy. Pain is in different areas. No pain in vaginal area prior to child delivery. Marinoff scale 3. Not on birth control right now. No estrogen medications. Lyrica for nerve pain. Muscle relaxers prescribed. Clitoral stimulation appears to be a trigger. Everything seems to be more hypersensitive. Appears pain to be all the time. Patient empties bladder ~ 6x/day. No pain with urination. Hydration: within 1 hour 3x 160z water; juice and coffee. Bowel movements 1x/day. [place] stool scale type 4. Denies radicular symptoms. Patient denies  leakage. Pain lasts after intercourse for at least a couple of days. Patient reports pain with every sexual position.  -AC         Subjective Pain    Able to rate subjective pain? yes  -AC      Pre-Treatment Pain Level 0  -AC      Post-Treatment Pain Level 0  -AC         Exercise 1    Exercise Name 1 Diaphragmatic breathing  -      Cueing 1 Verbal;Tactile  -AC      Time 1 3 minutes  -AC         Exercise 2    Exercise Name 2 Happy baby pose  -AC      Cueing 2 Verbal;Tactile  -AC      Reps 2 2  -AC      Time 2 30 sec hold  -AC         Exercise 3    Exercise Name 3 B figure 4 stretch  -AC      Cueing 3 Verbal;Tactile  -AC      Reps 3 2  -AC      Time 3 30 sec hold  -AC         Exercise 4    Exercise Name 4 Patient education  -      Additional Comments see flowsheet  -            User Key  (r) = Recorded By, (t) = Taken By, (c) = Cosigned By    Initials Name Provider Type    Cyn Gonzalez, PT Physical Therapist                             PT OP Goals     Row Name 04/03/23 1300          PT Short Term Goals    STG Date to Achieve 04/24/23  -     STG 1 Patient to demonstrate x 3 minutes of diaphragmatic breathing with proper technique and no cues from PT.  -     STG 2 Patient to demonstrate good toileting schedules 6-8x/day for bladder as to provide increase pelvic health.  -     STG 3 Patient to demo good understanding and form with perineal massage with no cueing from PT.  -     STG 4 Patient to demonstrate normal resting tone to PF in supine position of 2.9mv or less.  -     STG 5 Patient to be able to contract with isolation the PF and release fully to amplitudes lower than 2.9mv consistently x 10 reps.  -     STG 6 Patient to verbalize understanding and demonstrate proper insertion of dilator to help promote increase in tissue motility of the vaginal introitus to reduce pain upon sexual intercourse.  -     STG 7 Patient to verbalize perineal care to offer good hydration to improve mobility of  tissues to stretch  -     STG 8 No TTP of R obturator internus upon examination.  -        Long Term Goals    LTG 1 Patient to report Marinoff Scale of 0-1 such that she can tolerate intimacy without discomfort.  -     LTG 2 Patient to report FSFI score of 26.55 or greater indicating no evidence of female sexual dysfunction present that limits ability for her tolerance in sexual health.  -     LTG 3 Subjectively report 70% improvements with sexual intimacy and confidence in her sexual health and understanding.  -     LTG 4 Patient to demonstrate normal PF contractions and relaxations in seated and standing postures identifying improved control to PF in variable settings.  -     LTG 5 Pt to be able to perform perineal excursion without accessory muscle use and isolate muscle control.  -     LTG 6 Patient to be able to perform 10 PF isolated contractions with good control and full return to resting position with less than 5 sec delay.  -     LTG 7 Independent with self-care management/HEP to prevent the reoccurrence of symptoms in the future.  -        Time Calculation    PT Goal Re-Cert Due Date 04/24/23  -           User Key  (r) = Recorded By, (t) = Taken By, (c) = Cosigned By    Initials Name Provider Type    Cyn Gonzalez PT Physical Therapist                Therapy Education  Education Details: HEP: provided this visit; perineal and vulvar hygeine; edon PF anatomy and physiology; proper hydration intake  Given: HEP, Pain management, Symptoms/condition management  Program: New  How Provided: Verbal, Demonstration, Written  Provided to: Patient  Level of Understanding: Verbalized, Demonstrated               Time Calculation:   Start Time: 1354  Stop Time: 1502  Time Calculation (min): 68 min  Total Timed Code Minutes- PT: 68 minute(s)  Therapy Charges for Today     Code Description Service Date Service Provider Modifiers Qty    [id number]  PT THER SUPP EA 15 MIN 4/3/2023 Gerard  Autumn, PT GP 1    [id number]  PT EVAL MOD COMPLEXITY 4 4/3/2023 Gee, Autumn, PT GP 1                  Autumn Gee, PT , DPT 4/3/2023

## 2023-04-10 ENCOUNTER — APPOINTMENT (OUTPATIENT)
Dept: PHYSICAL THERAPY | Facility: HOSPITAL | Age: 33
End: 2023-04-10
Payer: COMMERCIAL

## 2023-04-17 ENCOUNTER — HOSPITAL ENCOUNTER (OUTPATIENT)
Dept: PHYSICAL THERAPY | Facility: HOSPITAL | Age: 33
Setting detail: THERAPIES SERIES
Discharge: HOME | End: 2023-04-17
Payer: COMMERCIAL

## 2023-04-17 DIAGNOSIS — N94.2 VAGINISMUS: Primary | ICD-10-CM

## 2023-04-17 PROCEDURE — 97110 THERAPEUTIC EXERCISES: CPT

## 2023-04-17 PROCEDURE — 97535 SELF CARE MNGMENT TRAINING: CPT

## 2023-04-17 PROCEDURE — 97140 MANUAL THERAPY 1/> REGIONS: CPT

## 2023-04-17 NOTE — THERAPY TREATMENT NOTE
"    Outpatient Physical Therapy Pelvic Health Treatment Note  [facility]     Patient Name: Mildred Li  : 1990  MRN: [id number]  Today's Date: 2023    Patient seen for 2 PT sessions.  Patient reports N/A% of improvement.  Next MD appt: lisa/prn  Recertification: 2023      Therapy Diagnosis: Pelvic pain     Visit Date: 2023    Visit Dx:    ICD-10-CM ICD-9-CM   1. Vaginismus  N94.2 625.1       Patient Active Problem List   Diagnosis   • Positive GBS test         Pelvic Health     Row Name 23 1400             Subjective Comments    Subjective Comments Patient reports last week she was on her period and it was painful. Rpeorts that is why she didnt come to PT. \"started to pay attention more to pain with intercourse. Everything still hurts the same with sex. Most abdominals on R side seem to be mary ellen and feels like a strain during intercourse. Pain with abdominal contractions. Back appears to be hurting more. Low back pain all over and is constant. Back pain started over the last 4-5 months. also when pelvic pain started. Has started to do yoga 1 month ago. Breathing exercises have been okay. Been doing them every day. Breathing has been getting easier. Has attempted to before breathing during sex. Every position hurts. All about the same. Stimulation of clitoris bothers her. Pain right now in vaginal area due to intercourse attempt last night. Her ovaries hurt more during intercourse. Cysts over ovaries.\"  -AC         Pregnancy Questions    Number of Pregnancies 2  -AC      Number of Miscarriages 0  -AC      Has the patient had an ? No  -AC      Number of Children 2  -AC      Type of Previous Deliveries Vaginal  -AC      Pregnancy Comments 2 tears with delivery, episiotomies  -AC         Pain Assessment    Pain Assessment 0-10  -AC      Pain Score --  4/10 low back, 6/10 vaginal pain  -AC         Lumbar/SI Special Tests    Slump Test (Neural Tension) " Bilateral:;Negative  -AC      Long Sit Test (Pelvic Malalignment) Bilateral:;Positive  L LE appeared to be longer once long sitting  -AC      SLR (Neural Tension) Bilateral:;Negative  -AC      SI Compression Test (SI Dysfunction) Bilateral:;Negative  -AC      SI Distraction Test (SI Dysfunction) Bilateral:;Negative  -AC      CLINTON (hip vs. SI Dysfunction) Right:;Positive  -AC      FAIR Test (Piriformis Syndrome) Bilateral:;Negative  -AC         Lumbosacral Palpation    Lumbosacral Palpation? Yes  -AC      SI Bilateral:;Tender  -AC      Piriformis Right:;Tender;Guarded/taut  -AC      Quadratus Lumborum Right:;Tender;Guarded/taut  -AC      Iliopsoas Bilateral:;Tender;Guarded/taut  -AC      Lumbosacral Palpation Comments TTP of B glute med.  -AC         Pelvic Floor Muscle    External Pelvic Floor Comments Abdominal assessment revealed TTP of R rectus abdominis with myofascial restrictions noted. Diastasis recti present with 3 finger width and 2nd DIP joint depth. Good gut motility noted.  -AC            User Key  (r) = Recorded By, (t) = Taken By, (c) = Cosigned By    Initials Name Provider Type     GeeCyn, PT Physical Therapist               PT Ortho     Row Name 04/17/23 1600       Posture/Observations    Forward Head Bilateral:;Normal  -AC    Rounded Shoulders Bilateral:;Mild  -AC    Scapular Elevation Bilateral:;Normal  -AC    Scoliosis Bilateral:;Normal  -AC    Lumbar lordosis Bilateral:;Normal  -AC    Iliac crests Bilateral:;Normal  pelvis level  -AC          User Key  (r) = Recorded By, (t) = Taken By, (c) = Cosigned By    Initials Name Provider Type     Cyn Gee PT Physical Therapist                            PT Assessment/Plan     Row Name 04/17/23 1600          PT Assessment    Assessment Comments This is the patient's second visit since initial eval. Patient demo good recall of HEP. Diaphragmatic breathing appears to be improved with good technique and few cues required from PT.  "Lumbar/SI screen revealed posterior innominate on L LE, myofascial restrictions of R glute med, R piriformis, and R QL. Abdominal assessment revealed myofascial restrictions to B iliopsoas and R rectus abdominis. Good gut motility noted. Patient tolerated new therEx well. Bladder diary introduction provided at end of visit.  -AC     Rehab Potential Good  -AC     Patient/caregiver participated in establishment of treatment plan and goals Yes  -AC     Patient would benefit from skilled therapy intervention Yes  -AC        PT Plan    PT Frequency 2x/week;1x/week  -     PT Plan Comments Discuss perineal stretch next visit. Review bladder diary. Manual therapy to PF and abdomen prn.  -           User Key  (r) = Recorded By, (t) = Taken By, (c) = Cosigned By    Initials Name Provider Type     Cyn Gee, PT Physical Therapist                   OP Exercises     Row Name 04/17/23 1400             Subjective Comments    Subjective Comments Patient reports last week she was on her period and it was painful. Rpeorts that is why she didnt come to PT. \"started to pay attention more to pain with intercourse. Everything still hurts the same with sex. Most abdominals on R side seem to be mary ellen and feels like a strain during intercourse. Pain with abdominal contractions. Back appears to be hurting more. Low back pain all over and is constant. Back pain started over the last 4-5 months. also when pelvic pain started. Has started to do yoga 1 month ago. Breathing exercises have been okay. Been doing them every day. Breathing has been getting easier. Has attempted to before breathing during sex. Every position hurts. All about the same. Stimulation of clitoris bothers her. Pain right now in vaginal area due to intercourse attempt last night. Her ovaries hurt more during intercourse. Cysts over ovaries.\"  -AC         Subjective Pain    Able to rate subjective pain? yes  -AC      Pre-Treatment Pain Level --  4/10 LBP, " 6/10 vaginal pain  -AC      Post-Treatment Pain Level --  0/10 LBP, 6/10 vaginal pain  -AC         Exercise 1    Exercise Name 1 Diaphragmatic breathing review  -AC      Time 1 5 minutes  -AC      Additional Comments incorporated PF relaxation  -AC         Exercise 2    Exercise Name 2 Happy baby pose  -AC      Reps 2 2  -AC      Time 2 30 sec hold  -AC         Exercise 3    Exercise Name 3 B figure 4 stretch  -AC      Reps 3 2  -AC      Time 3 30 sec hold  -AC         Exercise 4    Exercise Name 4 Child's pose  -AC      Reps 4 2  -AC      Time 4 1 minute  -AC         Exercise 5    Exercise Name 5 Seated ADD S  -AC      Reps 5 1  -AC      Time 5 30 sec hold  -AC         Exercise 6    Exercise Name 6 Lumbar/SI screen  -AC      Additional Comments see flowsheet  -AC         Exercise 7    Exercise Name 7 Abdominal assessment  -AC      Additional Comments see flowsheet  -AC         Exercise 8    Exercise Name 8 Manual therapy  -AC      Additional Comments see flowsheet  -AC         Exercise 9    Exercise Name 9 Bladder diary introduction  -AC      Additional Comments PT ed patient on how to properly fill out bladder diary correctly.  -AC            User Key  (r) = Recorded By, (t) = Taken By, (c) = Cosigned By    Initials Name Provider Type    Cyn Gonzalez, PT Physical Therapist              Manual Rx (last 36 hours)     Manual Treatments     Row Name 04/17/23 1500             Manual Rx 1    Manual Rx 1 Location Lumbar/SI assessment  -AC         Manual Rx 2    Manual Rx 2 Location Abdominal Assessment  -AC         Manual Rx 3    Manual Rx 3 Location R QL  -AC      Manual Rx 3 Type MFR with deactivation + reinforcement  -AC         Manual Rx 4    Manual Rx 4 Location R glute med  -AC      Manual Rx 4 Type MFR with deactivation  -AC         Manual Rx 5    Manual Rx 5 Location R piriformis  -AC      Manual Rx 5 Type MFR with deactivation  -AC            User Key  (r) = Recorded By, (t) = Taken By, (c) = Cosigned By     Initials Name Provider Type    Cyn Gonzalez, PT Physical Therapist                           PT OP Goals     Row Name 04/17/23 1600 04/17/23 1400       PT Short Term Goals    STG Date to Achieve 04/24/23  -AC --    STG 1 Patient to demonstrate x 3 minutes of diaphragmatic breathing with proper technique and no cues from PT.  -AC --    STG 1 Progress Ongoing;Progressing  -AC --    STG 2 Patient to demonstrate good toileting schedules 6-8x/day for bladder as to provide increase pelvic health.  -AC --    STG 3 Patient to demo good understanding and form with perineal massage with no cueing from PT.  -AC --    STG 4 Patient to demonstrate normal resting tone to PF in supine position of 2.9mv or less.  -AC --    STG 5 Patient to be able to contract with isolation the PF and release fully to amplitudes lower than 2.9mv consistently x 10 reps.  -AC --    STG 6 Patient to verbalize understanding and demonstrate proper insertion of dilator to help promote increase in tissue motility of the vaginal introitus to reduce pain upon sexual intercourse.  -AC --    STG 7 Patient to verbalize perineal care to offer good hydration to improve mobility of tissues to stretch  -AC --    STG 8 No TTP of R obturator internus upon examination.  -AC --       Long Term Goals    LTG 1 Patient to report Marinoff Scale of 0-1 such that she can tolerate intimacy without discomfort.  -AC --    LTG 2 Patient to report FSFI score of 26.55 or greater indicating no evidence of female sexual dysfunction present that limits ability for her tolerance in sexual health.  -AC --    LTG 3 Subjectively report 70% improvements with sexual intimacy and confidence in her sexual health and understanding.  -AC --    LTG 4 Patient to demonstrate normal PF contractions and relaxations in seated and standing postures identifying improved control to PF in variable settings.  -AC --    LTG 5 Pt to be able to perform perineal excursion without accessory muscle  use and isolate muscle control.  -AC --    LTG 6 Patient to be able to perform 10 PF isolated contractions with good control and full return to resting position with less than 5 sec delay.  -AC --    LTG 7 Independent with self-care management/HEP to prevent the reoccurrence of symptoms in the future.  -AC --       Time Calculation    PT Goal Re-Cert Due Date -- 04/24/23  -AC          User Key  (r) = Recorded By, (t) = Taken By, (c) = Cosigned By    Initials Name Provider Type    AC Cyn Gee, PT Physical Therapist                 Therapy Education  Education Details: HEP: child's pose and seated ADD S; cryotherapy for pain relief  Given: HEP, Pain management  Program: New, Reinforced, Progressed  How Provided: Verbal, Demonstration  Provided to: Patient  Level of Understanding: Verbalized, Demonstrated  02819 - PT Self Care/Mgmt Minutes: 8              Time Calculation:   Start Time: 1405  Stop Time: 1508  Time Calculation (min): 63 min  Timed Charges  57145 - PT Self Care/Mgmt Minutes: 8  Total Minutes  Timed Charges Total Minutes: 8   Total Minutes: 8  Therapy Charges for Today     Code Description Service Date Service Provider Modifiers Qty    47701038612 HC PT SELF CARE/MGMT/TRAIN EA 15 MIN 4/17/2023 Cyn Gee, PT GP 1    43585313469 HC PT MANUAL THERAPY EA 15 MIN 4/17/2023 Cyn Gee, PT GP 2    38661364802 HC PT THER PROC EA 15 MIN 4/17/2023 Cyn Gee, PT GP 1    40282019555 HC PT THER SUPP EA 15 MIN 4/17/2023 Cyn Gee, PT GP 1                    Cyn Gee PT , DPT 4/17/2023

## 2023-04-26 ENCOUNTER — TELEPHONE (OUTPATIENT)
Dept: PHYSICAL THERAPY | Facility: HOSPITAL | Age: 33
End: 2023-04-26
Payer: COMMERCIAL

## 2023-04-26 DIAGNOSIS — N94.2 VAGINISMUS: Primary | ICD-10-CM

## 2023-04-26 NOTE — TELEPHONE ENCOUNTER
Patient answered and reports she totally forgot today was Wednesday. Reports she thought it was Tuesday. Patient apologized and reports she would like to reschedule her appointment. Patient transferred to front office for rescheduling.

## 2023-05-01 ENCOUNTER — TELEPHONE (OUTPATIENT)
Dept: INTERNAL MEDICINE | Facility: CLINIC | Age: 33
End: 2023-05-01
Payer: COMMERCIAL

## 2023-05-01 NOTE — TELEPHONE ENCOUNTER
Pt is having body aches,  Hardly eating, trying to drink,       Dr Paula said it's okay to give work excuse.     She is drinking fluids, and will take the tylenol,  She will llet me know if she gets worse.

## 2023-05-15 DIAGNOSIS — M79.2 NERVE PAIN: ICD-10-CM

## 2023-05-15 NOTE — TELEPHONE ENCOUNTER
Mildred wants to know if you could give her something for Anxiety, (she had to cancel her last apt due to her being out sick)   She also needs her Lyrica refiled.   She is going to make apt with [facility]rick in the next week or two

## 2023-05-16 RX ORDER — PREGABALIN 25 MG/1
25 CAPSULE ORAL DAILY PRN
Qty: 20 CAPSULE | Refills: 1 | Status: SHIPPED | OUTPATIENT
Start: 2023-05-16

## 2023-08-09 ENCOUNTER — OFFICE VISIT (OUTPATIENT)
Dept: OBSTETRICS AND GYNECOLOGY | Facility: CLINIC | Age: 33
End: 2023-08-09
Payer: COMMERCIAL

## 2023-08-09 VITALS
DIASTOLIC BLOOD PRESSURE: 72 MMHG | WEIGHT: 153 LBS | BODY MASS INDEX: 28.16 KG/M2 | HEIGHT: 62 IN | SYSTOLIC BLOOD PRESSURE: 104 MMHG

## 2023-08-09 DIAGNOSIS — N94.10 DYSPAREUNIA, FEMALE: ICD-10-CM

## 2023-08-09 DIAGNOSIS — N92.0 MENORRHAGIA WITH REGULAR CYCLE: ICD-10-CM

## 2023-08-09 DIAGNOSIS — N94.6 DYSMENORRHEA: ICD-10-CM

## 2023-08-09 DIAGNOSIS — Z01.419 WELL WOMAN EXAM WITH ROUTINE GYNECOLOGICAL EXAM: Primary | ICD-10-CM

## 2023-08-09 DIAGNOSIS — M62.89 PELVIC FLOOR DYSFUNCTION IN FEMALE: ICD-10-CM

## 2023-08-09 DIAGNOSIS — Z12.4 ENCOUNTER FOR SCREENING FOR CERVICAL CANCER: ICD-10-CM

## 2023-08-09 DIAGNOSIS — N63.12 MASS OF UPPER INNER QUADRANT OF RIGHT BREAST: ICD-10-CM

## 2023-08-09 PROCEDURE — 87661 TRICHOMONAS VAGINALIS AMPLIF: CPT | Performed by: OBSTETRICS & GYNECOLOGY

## 2023-08-09 PROCEDURE — 87491 CHLMYD TRACH DNA AMP PROBE: CPT | Performed by: OBSTETRICS & GYNECOLOGY

## 2023-08-09 PROCEDURE — 87591 N.GONORRHOEAE DNA AMP PROB: CPT | Performed by: OBSTETRICS & GYNECOLOGY

## 2023-08-09 PROCEDURE — 87624 HPV HI-RISK TYP POOLED RSLT: CPT | Performed by: OBSTETRICS & GYNECOLOGY

## 2023-08-09 RX ORDER — METAXALONE 800 MG/1
800 TABLET ORAL 3 TIMES DAILY PRN
Qty: 30 TABLET | Refills: 3 | Status: SHIPPED | OUTPATIENT
Start: 2023-08-09

## 2023-08-09 NOTE — PROGRESS NOTES
"Chief Complaint  Gynecologic Exam (Pt here as returning gyn and referral from [facility] Montiel for pelvic pain, pt had u/s done in office today, pt informs me that the pelvic pain has worsened over the last 8mo since having baby, pt delivered in [place], thinks she had a pap after delivery, last pap 02/17/2017 normal with Mary Car[facility], she does report hx abnormal but denies hx cervical conization/LEEP) and Breast Problem (Pt reports she found a lump last week in the right breast that is approximately pea size, her daughter is a year old, she  for 10-12wks)    Subjective        Mildred Li presents to [facility]  History of Present Illness    Patient presents today as a new patient, self-referred  She is here for her yearly exam  Self breast exam, diet, exercise, phone use, seatbelt use all discussed  She is due for her Pap smear    She does have some vaginal pain and abnormal menses  Her vaginal pain appears to be random and associated with muscle spasms  She has been diagnosed with pelvic floor dysfunction and sees a pelvic floor physical therapist  Her symptoms may have improved over time but they are still present  She did well with Flexeril but it caused drowsiness  She has never been screened for interstitial cystitis    Her periods are monthly, heavy, and painful  Contraception in the past has caused worsening of her vaginal pain  They use condoms    She also noticed a breast mass yesterday    Objective   Vital Signs:  /72 (BP Location: Left arm, Patient Position: Sitting, Cuff Size: Adult)   Ht 157.5 cm (62\")   Wt 69.4 kg (153 lb)   BMI 27.98 kg/mý   Estimated body mass index is 27.98 kg/mý as calculated from the following:    Height as of this encounter: 157.5 cm (62\").    Weight as of this encounter: 69.4 kg (153 lb).               Physical Exam  Vitals and nursing note reviewed. Exam conducted with a chaperone present.   Constitutional:       Appearance: " Normal appearance.   HENT:      Head: Normocephalic and atraumatic.      Mouth/Throat:      Mouth: Mucous membranes are moist.   Eyes:      Extraocular Movements: Extraocular movements intact.      Pupils: Pupils are equal, round, and reactive to light.   Neck:      Vascular: No carotid bruit.   Cardiovascular:      Rate and Rhythm: Normal rate and regular rhythm.      Pulses: Normal pulses.      Heart sounds: Normal heart sounds. No murmur heard.    No friction rub. No gallop.   Pulmonary:      Effort: Pulmonary effort is normal. No respiratory distress.      Breath sounds: Normal breath sounds. No stridor. No wheezing, rhonchi or rales.   Chest:      Chest wall: No tenderness.   Breasts:     Breasts are symmetrical.      Right: Normal. No swelling, bleeding, inverted nipple, mass, nipple discharge, skin change or tenderness.      Left: Normal. No swelling, bleeding, inverted nipple, mass, nipple discharge, skin change or tenderness.       Abdominal:      General: Abdomen is flat. Bowel sounds are normal. There is no distension.      Palpations: Abdomen is soft. There is no mass.      Tenderness: There is no abdominal tenderness. There is no right CVA tenderness, left CVA tenderness, guarding or rebound.      Hernia: No hernia is present. There is no hernia in the left inguinal area or right inguinal area.   Genitourinary:     General: Normal vulva.      Exam position: Lithotomy position.      Pubic Area: No rash or pubic lice.       Labia:         Right: No rash, tenderness, lesion or injury.         Left: No rash, tenderness, lesion or injury.       Urethra: No prolapse, urethral pain, urethral swelling or urethral lesion.      Vagina: Normal.      Cervix: Normal.      Uterus: Normal. Not deviated, not enlarged, not fixed, not tender and no uterine prolapse.       Adnexa: Right adnexa normal and left adnexa normal.        Right: No mass, tenderness or fullness.          Left: No mass, tenderness or fullness.      Musculoskeletal:         General: Normal range of motion.      Cervical back: Normal range of motion and neck supple. No rigidity. No muscular tenderness.   Lymphadenopathy:      Cervical: No cervical adenopathy.      Upper Body:      Right upper body: No supraclavicular, axillary or pectoral adenopathy.      Left upper body: No supraclavicular, axillary or pectoral adenopathy.      Lower Body: No right inguinal adenopathy. No left inguinal adenopathy.   Skin:     General: Skin is warm and dry.   Neurological:      General: No focal deficit present.      Mental Status: She is alert and oriented to person, place, and time. Mental status is at baseline.   Psychiatric:         Mood and Affect: Mood normal.         Behavior: Behavior normal.         Thought Content: Thought content normal.         Judgment: Judgment normal.      Result Review :                   Assessment and Plan   Diagnoses and all orders for this visit:    1. Well woman exam with routine gynecological exam (Primary)    2. Encounter for screening for cervical cancer  -     Liquid-based Pap Smear, Screening    3. Mass of upper inner quadrant of right breast    4. Pelvic floor dysfunction in female    5. Menorrhagia with regular cycle    6. Dysmenorrhea    7. Dyspareunia, female    Other orders  -     metaxalone (Skelaxin) 800 MG tablet; Take 1 tablet by mouth 3 (Three) Times a Day As Needed for Muscle Spasms.  Dispense: 30 tablet; Refill: 3             Follow Up   Return in about 1 month (around 9/9/2023) for with me.  Patient was given instructions and counseling regarding her condition or for health maintenance advice. Please see specific information pulled into the AVS if appropriate.     Repeat breast exam in 1 month  Interstitial cystitis questionnaire and online research  Trial of Skelaxin    Jaspreet Apple MD

## 2023-08-11 LAB
C TRACH RRNA CVX QL NAA+PROBE: NOT DETECTED
N GONORRHOEA RRNA SPEC QL NAA+PROBE: NOT DETECTED
TRICHOMONAS VAGINALIS PCR: NOT DETECTED

## 2023-08-14 ENCOUNTER — TELEPHONE (OUTPATIENT)
Dept: INTERNAL MEDICINE | Facility: CLINIC | Age: 33
End: 2023-08-14
Payer: COMMERCIAL

## 2023-08-14 DIAGNOSIS — F41.9 ANXIETY: Primary | ICD-10-CM

## 2023-08-14 LAB
GEN CATEG CVX/VAG CYTO-IMP: NORMAL
HPV I/H RISK 4 DNA CVX QL PROBE+SIG AMP: NOT DETECTED
LAB AP CASE REPORT: NORMAL
LAB AP GYN ADDITIONAL INFORMATION: NORMAL
LAB AP GYN OTHER FINDINGS: NORMAL
Lab: NORMAL
PATH INTERP SPEC-IMP: NORMAL
STAT OF ADQ CVX/VAG CYTO-IMP: NORMAL

## 2023-08-14 RX ORDER — ESCITALOPRAM OXALATE 10 MG/1
10 TABLET ORAL DAILY
Qty: 30 TABLET | Refills: 2 | Status: SHIPPED | OUTPATIENT
Start: 2023-08-14

## 2023-08-14 NOTE — TELEPHONE ENCOUNTER
Patient called and asked if could write rx for Lexapro (she has taken it in the past)  the Buspar did not help, and had side effects from it. .    Please send to [facility] pharmacy

## 2023-08-18 ENCOUNTER — TELEPHONE (OUTPATIENT)
Dept: INTERNAL MEDICINE | Facility: CLINIC | Age: 33
End: 2023-08-18
Payer: COMMERCIAL

## 2023-08-18 NOTE — TELEPHONE ENCOUNTER
FEELS LIKE CYST RUPTURE, SHE'S ON HER PERIOD, PAIN IN LOWER QUAD RADIATING TO BACK. SHE'S SHAKING WITH PAIN.

## 2023-09-13 ENCOUNTER — OFFICE VISIT (OUTPATIENT)
Dept: OBSTETRICS AND GYNECOLOGY | Facility: CLINIC | Age: 33
End: 2023-09-13
Payer: COMMERCIAL

## 2023-09-13 VITALS
BODY MASS INDEX: 28.71 KG/M2 | WEIGHT: 156 LBS | DIASTOLIC BLOOD PRESSURE: 70 MMHG | SYSTOLIC BLOOD PRESSURE: 102 MMHG | HEIGHT: 62 IN

## 2023-09-13 DIAGNOSIS — Z78.9 NON-SMOKER: ICD-10-CM

## 2023-09-13 DIAGNOSIS — N92.0 MENORRHAGIA WITH REGULAR CYCLE: ICD-10-CM

## 2023-09-13 DIAGNOSIS — R10.2 PELVIC PAIN: ICD-10-CM

## 2023-09-13 DIAGNOSIS — N94.10 DYSPAREUNIA, FEMALE: ICD-10-CM

## 2023-09-13 DIAGNOSIS — N94.6 DYSMENORRHEA: ICD-10-CM

## 2023-09-13 DIAGNOSIS — M62.89 PELVIC FLOOR DYSFUNCTION IN FEMALE: Primary | ICD-10-CM

## 2023-09-13 RX ORDER — NORETHINDRONE ACETATE AND ETHINYL ESTRADIOL, ETHINYL ESTRADIOL AND FERROUS FUMARATE 1MG-10(24)
1 KIT ORAL DAILY
Qty: 28 TABLET | Refills: 3 | Status: SHIPPED | OUTPATIENT
Start: 2023-09-13 | End: 2024-01-03

## 2023-09-13 NOTE — PROGRESS NOTES
"Chief Complaint  Follow-up (Pt here for 1mo f/u on breast mass found at last appt as well as trial of Skelaxin for pelvic pain and reports she is unsure if it helped or not, pt did complete IC questionnaire and does not think she has IC)    Subjective        Mildred Li presents to [facility] OBG[facility]  History of Present Illness    Patient presents today for follow-up  She continues to notice some improvement with at home pelvic floor physical therapy exercises  Skelaxin may or may not be helping  Her interstitial cystitis questionnaire returned a score of 10  Her online research is not consistent with the disease  We again reviewed her menorrhagia and dysmenorrhea  She desires a trial of oral contraceptives    When initially starting birth control pills, the first pilll should be taken on the Sunday following the onset of your next cycle.  For maximum effectiveness, it should be taken the same time each day.  Because it may take 1 month to become effective, you should use of alternative contraception for the first month.  There is the potential for breakthrough bleeding to occur for up to 4 cycles.     Should you fail to take your birth control on time:    If you miss a single pill, take it immediately that day.  If you an entire day, take the pill you missed in addition to the pill you are scheduled to take.  If you miss 2 consecutive pills, take the extra pill each of the next 2 days.  If you ever miss 3 consecutive pills, discard the remainder of the pack and start a new pack of pills the Sunday after the next menses begins.  Should you have any questions about how to manage this, call the office during office hours and we will review this with you      Objective   Vital Signs:  /70 (BP Location: Left arm, Patient Position: Sitting, Cuff Size: Adult)   Ht 157.5 cm (62\")   Wt 70.8 kg (156 lb)   BMI 28.53 kg/m²   Estimated body mass index is 28.53 kg/m² as calculated from the " "following:    Height as of this encounter: 157.5 cm (62\").    Weight as of this encounter: 70.8 kg (156 lb).               Physical Exam  Exam conducted with a chaperone present.   Chest:      Chest wall: No mass, lacerations, deformity, swelling, tenderness, crepitus or edema. There is no dullness to percussion.   Breasts:     Right: Normal.      Left: Normal.      Result Review :                   Assessment and Plan   Diagnoses and all orders for this visit:    1. Pelvic floor dysfunction in female (Primary)    2. Pelvic pain    3. Menorrhagia with regular cycle    4. Dysmenorrhea    5. Dyspareunia, female    6. Non-smoker    Other orders  -     Norethin-Eth Estrad-Fe Biphas (Lo Loestrin Fe) 1 MG-10 MCG / 10 MCG tablet; Take 1 tablet by mouth Daily for 112 days.  Dispense: 28 tablet; Refill: 3             Follow Up   Return in about 3 months (around 12/13/2023) for with me.  Patient was given instructions and counseling regarding her condition or for health maintenance advice. Please see specific information pulled into the AVS if appropriate.     Trial of oral contraceptives  Call for concerns or questions    Jaspreet Apple MD      "

## 2023-09-25 ENCOUNTER — OFFICE VISIT (OUTPATIENT)
Dept: INTERNAL MEDICINE | Facility: CLINIC | Age: 33
End: 2023-09-25
Payer: COMMERCIAL

## 2023-09-25 VITALS
SYSTOLIC BLOOD PRESSURE: 107 MMHG | HEIGHT: 62 IN | BODY MASS INDEX: 29.81 KG/M2 | DIASTOLIC BLOOD PRESSURE: 82 MMHG | HEART RATE: 75 BPM | WEIGHT: 162 LBS | OXYGEN SATURATION: 99 %

## 2023-09-25 DIAGNOSIS — M62.89 PELVIC FLOOR DYSFUNCTION IN FEMALE: Primary | ICD-10-CM

## 2023-09-25 DIAGNOSIS — F98.8 ATTENTION DEFICIT DISORDER (ADD) IN ADULT: ICD-10-CM

## 2023-09-25 DIAGNOSIS — F41.9 ANXIETY: ICD-10-CM

## 2023-09-25 DIAGNOSIS — G43.909 MIGRAINE WITHOUT STATUS MIGRAINOSUS, NOT INTRACTABLE, UNSPECIFIED MIGRAINE TYPE: ICD-10-CM

## 2023-09-25 DIAGNOSIS — Z11.59 ENCOUNTER FOR HEPATITIS C SCREENING TEST FOR LOW RISK PATIENT: Primary | ICD-10-CM

## 2023-09-25 DIAGNOSIS — M79.2 NERVE PAIN: ICD-10-CM

## 2023-09-25 RX ORDER — GALCANEZUMAB 120 MG/ML
120 INJECTION, SOLUTION SUBCUTANEOUS
Qty: 1.12 ML | Refills: 11 | Status: SHIPPED | OUTPATIENT
Start: 2023-09-25

## 2023-09-25 RX ORDER — DEXTROAMPHETAMINE SACCHARATE, AMPHETAMINE ASPARTATE, DEXTROAMPHETAMINE SULFATE AND AMPHETAMINE SULFATE 3.75; 3.75; 3.75; 3.75 MG/1; MG/1; MG/1; MG/1
15 TABLET ORAL 2 TIMES DAILY
Qty: 60 TABLET | Refills: 0 | Status: SHIPPED | OUTPATIENT
Start: 2023-09-25 | End: 2023-10-25

## 2023-09-25 RX ORDER — SUMATRIPTAN 100 MG/1
100 TABLET, FILM COATED ORAL
COMMUNITY
End: 2023-09-25 | Stop reason: SDUPTHER

## 2023-09-25 RX ORDER — SUMATRIPTAN 100 MG/1
100 TABLET, FILM COATED ORAL
Qty: 10 TABLET | Refills: 11 | Status: SHIPPED | OUTPATIENT
Start: 2023-09-25

## 2023-09-25 RX ORDER — GUAIFENESIN AND DEXTROMETHORPHAN HYDROBROMIDE 600; 30 MG/1; MG/1
1 TABLET, EXTENDED RELEASE ORAL 2 TIMES DAILY PRN
Qty: 20 EACH | Refills: 0 | Status: SHIPPED | OUTPATIENT
Start: 2023-09-25 | End: 2023-10-05

## 2023-09-25 RX ORDER — DEXTROAMPHETAMINE SACCHARATE, AMPHETAMINE ASPARTATE, DEXTROAMPHETAMINE SULFATE AND AMPHETAMINE SULFATE 3.75; 3.75; 3.75; 3.75 MG/1; MG/1; MG/1; MG/1
15 TABLET ORAL 2 TIMES DAILY
Qty: 60 TABLET | Refills: 0 | Status: SHIPPED | OUTPATIENT
Start: 2023-11-20 | End: 2023-12-20

## 2023-09-25 RX ORDER — ESCITALOPRAM OXALATE 10 MG/1
10 TABLET ORAL DAILY
Qty: 90 TABLET | Refills: 3 | Status: SHIPPED | OUTPATIENT
Start: 2023-09-25

## 2023-09-25 RX ORDER — TOPIRAMATE 25 MG/1
25 TABLET ORAL 2 TIMES DAILY
Qty: 60 TABLET | Refills: 11 | Status: SHIPPED | OUTPATIENT
Start: 2023-09-25

## 2023-09-25 RX ORDER — RIMEGEPANT SULFATE 75 MG/75MG
75 TABLET, ORALLY DISINTEGRATING ORAL
Qty: 15 TABLET | Refills: 11 | Status: SHIPPED | OUTPATIENT
Start: 2023-09-25

## 2023-09-25 RX ORDER — DEXTROAMPHETAMINE SACCHARATE, AMPHETAMINE ASPARTATE, DEXTROAMPHETAMINE SULFATE AND AMPHETAMINE SULFATE 3.75; 3.75; 3.75; 3.75 MG/1; MG/1; MG/1; MG/1
15 TABLET ORAL 2 TIMES DAILY
Qty: 60 TABLET | Refills: 0 | Status: SHIPPED | OUTPATIENT
Start: 2023-10-23 | End: 2023-11-22

## 2023-09-25 RX ORDER — CYCLOBENZAPRINE HCL 10 MG
10 TABLET ORAL 3 TIMES DAILY PRN
Qty: 90 TABLET | Refills: 11 | Status: SHIPPED | OUTPATIENT
Start: 2023-09-25

## 2023-09-25 NOTE — TELEPHONE ENCOUNTER
----- Message from Mildred Li sent at 9/25/2023  2:24 PM CDT -----  Regarding: Blood clots.   Contact: [phone]  Yes ma’am. It can also be sent to [facility] if it’s easier. I don’t care which.

## 2023-09-26 ENCOUNTER — PRIOR AUTHORIZATION (OUTPATIENT)
Dept: INTERNAL MEDICINE | Facility: CLINIC | Age: 33
End: 2023-09-26
Payer: COMMERCIAL

## 2023-09-26 RX ORDER — METAXALONE 800 MG/1
800 TABLET ORAL 3 TIMES DAILY PRN
Qty: 30 TABLET | Refills: 3 | Status: SHIPPED | OUTPATIENT
Start: 2023-09-26

## 2023-09-26 NOTE — TELEPHONE ENCOUNTER
Mildred Li Key: [id number] - PA Case ID: [id number] - Rx #: [id number] help? Call us at [phone]  Status  Sent to [facility]  Drug  SUMAtriptan Succinate 100MG tablets  Form  [employer] Electronic PA Form (2017 NCPDP)  Original Claim Info  76 SUBMIT PA REQUEST TO:[email or web address] DOSE OF [email or web address]      Mildred Li Key: [id number] - PA Case ID: [id number] - Rx #: [id number] help? Call us at [phone]  Status  Sent to [facility]  Drug  Nurtec 75MG dispersible tablets  Form  [employer] Electronic PA Form (2017 NCPDP)  Original Claim Info  75    Both Medications have been approved.

## 2023-09-26 NOTE — TELEPHONE ENCOUNTER
Called to f/u with pt on Skelaxin PA as it looks like the Skelaxin was not helping and wanted to only try a trial of ocps rather than the Skelaxin, no answer, left voicemail to return my call at her earliest convenience.

## 2023-09-26 NOTE — TELEPHONE ENCOUNTER
Pt returned my call and informed me that at her last appt she thought she was taking the Skelaxin that we prescribed, however, she was taking Tizanidine that her previous OBGYN prescribed and this made her drowsy and could not continue that.  She informed me that she would like a new prescription of the Skelaxin to do a trial of that while she is doing the ocp.  In addition, she informed me that she saw Dr. Kaur yesterday and he recommended that she try Topamax for her pelvic floor dysfunction and wanted to see if you had heard of that working in cases like hers.  She also wanted to verify that she was okay to continue to take the birth control pills with her hx of migraines as Dr. Kaur brought this up at her appointment yesterday as well.  I pended a rx for migraines for review.

## 2023-09-26 NOTE — PROGRESS NOTES
"Chief Complaint  Annual Exam and ADD    Subjective        Mildred Li presents to [facility]  ADD    Mildred Li is a 33 y.o. female who presents for a recheck of ADHD.  she reports non-compliance with medication regimen because distance.  she reports No side effects. she also reports symptoms have improved since start of medication.  Reports that she is going to attempt to be significantly better about making appointments and making it into the office for her medication she states helped significantly when she does take it and has difficulty concentrating without it.    Current symptoms include:   Inattention: often has difficulty sustaining attention in tasks or play activities - Yes   often has difficulty organizing tasks and activities - Yes   is often distracted by extraneous stimuli - Yes  were reported  Hyperactivity: None were reported  Impulsivity: None were reported  Mental Health: Excessive anxiety/ worry- yes were reported     Patient states that her anxiety is well controlled with Lexapro and states that it works well for her.  She would like to go ahead and get her routine labs including screening for hepatitis C and a lipid profile at this time.  Patient had been taking Lyrica for nerve pain in her vagina during intercourse after childbirth with significant discussion and with her history of migraines we will try Topamax to see if this is effective.  Patient would also like to get back on her migraine treatments and will attempt to follow back up with neurology for these medications.    Objective   Vital Signs:  /82 (BP Location: Left arm, Patient Position: Sitting, Cuff Size: Adult)   Pulse 75   Ht 157.5 cm (62\")   Wt 73.5 kg (162 lb)   SpO2 99%   BMI 29.63 kg/m²   Estimated body mass index is 29.63 kg/m² as calculated from the following:    Height as of this encounter: 157.5 cm (62\").    Weight as of this encounter: 73.5 kg (162 lb).         "       Physical Exam  Vitals and nursing note reviewed.   Constitutional:       General: She is not in acute distress.     Appearance: Normal appearance.   HENT:      Head: Normocephalic.   Eyes:      Extraocular Movements: Extraocular movements intact.      Pupils: Pupils are equal, round, and reactive to light.   Cardiovascular:      Rate and Rhythm: Normal rate and regular rhythm.      Heart sounds: Normal heart sounds. No murmur heard.  Pulmonary:      Effort: Pulmonary effort is normal. No respiratory distress.      Breath sounds: Normal breath sounds. No rhonchi or rales.   Abdominal:      General: Abdomen is flat. Bowel sounds are normal.      Palpations: Abdomen is soft.   Neurological:      General: No focal deficit present.      Mental Status: She is alert.      Result Review :                   Assessment and Plan   Diagnoses and all orders for this visit:    1. Encounter for hepatitis C screening test for low risk patient (Primary)  -     Hepatitis C antibody; Future  -     Lipid Panel; Future    2. Anxiety  -     escitalopram (Lexapro) 10 MG tablet; Take 1 tablet by mouth Daily.  Dispense: 90 tablet; Refill: 3    3. Attention deficit disorder (ADD) in adult  -     amphetamine-dextroamphetamine (Adderall) 15 MG tablet; Take 1 tablet by mouth 2 (Two) Times a Day for 30 days.  Dispense: 60 tablet; Refill: 0  -     amphetamine-dextroamphetamine (Adderall) 15 MG tablet; Take 1 tablet by mouth 2 (Two) Times a Day for 30 days.  Dispense: 60 tablet; Refill: 0  -     amphetamine-dextroamphetamine (Adderall) 15 MG tablet; Take 1 tablet by mouth 2 (Two) Times a Day for 30 days.  Dispense: 60 tablet; Refill: 0    4. Nerve pain  -     topiramate (Topamax) 25 MG tablet; Take 1 tablet by mouth 2 (Two) Times a Day.  Dispense: 60 tablet; Refill: 11    5. Migraine without status migrainosus, not intractable, unspecified migraine type  -     galcanezumab-gnlm (Emgality) 120 MG/ML auto-injector pen; Inject 1 mL under the  skin into the appropriate area as directed Every 30 (Thirty) Days.  Dispense: 1.12 mL; Refill: 11  -     cyclobenzaprine (FLEXERIL) 10 MG tablet; Take 1 tablet by mouth 3 (Three) Times a Day As Needed for Muscle Spasms.  Dispense: 90 tablet; Refill: 11  -     SUMAtriptan (IMITREX) 100 MG tablet; Take 1 tablet by mouth Every 2 (Two) Hours As Needed for Migraine. Take one tablet at onset of headache. May repeat dose one time in 2 hours if headache not relieved.  Dispense: 10 tablet; Refill: 11  -     Rimegepant Sulfate (Nurtec) 75 MG tablet dispersible tablet; Take 1 tablet by mouth Every Other Day.  Dispense: 15 tablet; Refill: 11  -     guaifenesin-dextromethorphan (MUCINEX DM)  MG tablet sustained-release 12 hour tablet; Take 1 tablet by mouth 2 (Two) Times a Day As Needed (eustation tube dysfunction) for up to 10 days.  Dispense: 20 each; Refill: 00      As part of this patient's treatment plan, I am prescribing stimulants. The patient has been made aware of appropriate use of such medications, including potential for dependence and/or overdose. It has also been made clear that these medications are for use by this patient only, without concomitant use of alcohol or other substances unless prescribed.    Patient has completed prescribing agreement detailing terms of continued prescribing of controlled substances, including monitoring QUE reports, urine drug screening, and pill counts if necessary. The patient is aware that inappropriate use will result in cessation of prescribing such medications, and possible termination from this practice.    History and physical exam exhibit continued safe and appropriate use of controlled substances.    1.  Controlled substance abuse agreement discussed and copy in record: YES  2.  Discussed:   Risk of addiction: YES   Specific risk of medications:YES   Side effects of medications: YES   Reasonable expectations of the medication: YES  3.  Treatment  Objectives:   Discussed management of constipation: YES   Discussed management of other side effects: YES  4.  eKASPER:     QUE report has been reviewed by: Lalito Kaur MD on 09/26/23 in the PDMD in the electronic medical record.  eKASPER has been reviewed and there are no concerns.  Education regarding the treatment of controlled substances has been provided to the patient.  This education includes, but is not limited to, safe use, proper disposal, how to discontinue use, and the possibility of developing dependency and/or addiction to these medications. The patients has been informed of all risks, benefits and alternatives associated with controlled substance use and has given informed consent to receive a controlled substance.     Results/Date of last QUE:  appropriate  5.  Results/Date of last drug screen:  appropriate  6.  Follow up plan:    Follow Up in One Months Time              Continue on current medications as directed    EMR Dictation/Transcription disclaimer:   Some of this note may be an electronic transcription/translation of spoken language to printed text. The electronic translation of spoken language may permit erroneous, or at times, nonsensical words or phrases to be inadvertently transcribed; Although I have reviewed the note for such errors, some may still exist.          Follow Up   No follow-ups on file.  Patient was given instructions and counseling regarding her condition or for health maintenance advice. Please see specific information pulled into the AVS if appropriate.

## 2023-12-14 DIAGNOSIS — B37.9 YEAST INFECTION: Primary | ICD-10-CM

## 2023-12-14 RX ORDER — FLUCONAZOLE 200 MG/1
200 TABLET ORAL DAILY
Qty: 2 TABLET | Refills: 1 | Status: SHIPPED | OUTPATIENT
Start: 2023-12-14

## 2023-12-26 ENCOUNTER — OFFICE VISIT (OUTPATIENT)
Dept: INTERNAL MEDICINE | Facility: CLINIC | Age: 33
End: 2023-12-26
Payer: COMMERCIAL

## 2023-12-26 VITALS
DIASTOLIC BLOOD PRESSURE: 71 MMHG | OXYGEN SATURATION: 100 % | WEIGHT: 165 LBS | SYSTOLIC BLOOD PRESSURE: 108 MMHG | HEART RATE: 75 BPM | BODY MASS INDEX: 30.36 KG/M2 | HEIGHT: 62 IN | TEMPERATURE: 98 F | RESPIRATION RATE: 18 BRPM

## 2023-12-26 DIAGNOSIS — M79.2 NERVE PAIN: ICD-10-CM

## 2023-12-26 DIAGNOSIS — M54.42 CHRONIC MIDLINE LOW BACK PAIN WITH BILATERAL SCIATICA: ICD-10-CM

## 2023-12-26 DIAGNOSIS — M54.41 CHRONIC MIDLINE LOW BACK PAIN WITH BILATERAL SCIATICA: ICD-10-CM

## 2023-12-26 DIAGNOSIS — G89.29 CHRONIC MIDLINE LOW BACK PAIN WITH BILATERAL SCIATICA: ICD-10-CM

## 2023-12-26 DIAGNOSIS — F98.8 ATTENTION DEFICIT DISORDER (ADD) IN ADULT: Primary | ICD-10-CM

## 2023-12-26 PROCEDURE — 99213 OFFICE O/P EST LOW 20 MIN: CPT | Performed by: FAMILY MEDICINE

## 2023-12-26 RX ORDER — DEXTROAMPHETAMINE SACCHARATE, AMPHETAMINE ASPARTATE, DEXTROAMPHETAMINE SULFATE AND AMPHETAMINE SULFATE 3.75; 3.75; 3.75; 3.75 MG/1; MG/1; MG/1; MG/1
15 TABLET ORAL 2 TIMES DAILY
Qty: 60 TABLET | Refills: 0 | Status: SHIPPED | OUTPATIENT
Start: 2023-12-26 | End: 2024-01-25

## 2023-12-26 RX ORDER — DEXTROAMPHETAMINE SACCHARATE, AMPHETAMINE ASPARTATE, DEXTROAMPHETAMINE SULFATE AND AMPHETAMINE SULFATE 3.75; 3.75; 3.75; 3.75 MG/1; MG/1; MG/1; MG/1
15 TABLET ORAL 2 TIMES DAILY
Qty: 60 TABLET | Refills: 0 | Status: SHIPPED | OUTPATIENT
Start: 2024-02-20 | End: 2024-03-21

## 2023-12-26 RX ORDER — DEXTROAMPHETAMINE SACCHARATE, AMPHETAMINE ASPARTATE, DEXTROAMPHETAMINE SULFATE AND AMPHETAMINE SULFATE 3.75; 3.75; 3.75; 3.75 MG/1; MG/1; MG/1; MG/1
15 TABLET ORAL 2 TIMES DAILY
Qty: 60 TABLET | Refills: 0 | Status: SHIPPED | OUTPATIENT
Start: 2024-01-23 | End: 2024-02-22

## 2023-12-26 RX ORDER — TOPIRAMATE 50 MG/1
50 TABLET, FILM COATED ORAL 2 TIMES DAILY
Qty: 60 TABLET | Refills: 11 | Status: SHIPPED | OUTPATIENT
Start: 2023-12-26

## 2023-12-26 NOTE — PROGRESS NOTES
"Chief Complaint  Med Refill    Subjective        Mildred Li presents to [facility]  ADD      Mildred Li is a 33 y.o. female who presents for a recheck of ADHD.  she reports compliance with medication regimen.  she reports No side effects. she also reports symptoms have improved since start of medication.  No evidence of diversion or abuse has been noted with this patient.  Patient reports that the medication allows her to continue to be focused and maintain concentration at work.  she is happy with the current dose and does not feel that she needs to do any changes at this time.    Current symptoms include:   Inattention: None were reported  Hyperactivity: None were reported  Impulsivity: None were reported  Mental Health: No symptoms of depression, anxiety, bipolar disease or psychosis or conduct disorders. were reported    Patient also complains of some chronic lower back pain with bilateral sciatica.  She states that this occurred after the birth of her second child and has been having some issues since that time she states that this also occurred during her first pregnancy.  Will go ahead and take a look at her lower back and get some x-rays to evaluate this at this time she states that this could also be some of the issues with her pelvic pain.  She states that her Topamax is not working effectively for her pelvic pain would like to try increasing the dosage to see if this would help.    Objective   Vital Signs:  /71 (BP Location: Right arm, Patient Position: Sitting, Cuff Size: Adult)   Pulse 75   Temp 98 °F (36.7 °C) (Temporal)   Resp 18   Ht 157.5 cm (62.01\")   Wt 74.8 kg (165 lb)   SpO2 100%   BMI 30.17 kg/m²   Estimated body mass index is 30.17 kg/m² as calculated from the following:    Height as of this encounter: 157.5 cm (62.01\").    Weight as of this encounter: 74.8 kg (165 lb).               Physical Exam  Vitals and nursing note reviewed. "   Constitutional:       General: She is not in acute distress.     Appearance: Normal appearance.   HENT:      Head: Normocephalic.   Eyes:      Extraocular Movements: Extraocular movements intact.      Pupils: Pupils are equal, round, and reactive to light.   Cardiovascular:      Rate and Rhythm: Normal rate and regular rhythm.      Heart sounds: Normal heart sounds. No murmur heard.  Pulmonary:      Effort: Pulmonary effort is normal. No respiratory distress.      Breath sounds: Normal breath sounds. No rhonchi or rales.   Abdominal:      General: Abdomen is flat. Bowel sounds are normal.      Palpations: Abdomen is soft.   Neurological:      General: No focal deficit present.      Mental Status: She is alert.        Result Review :                   Assessment and Plan   Diagnoses and all orders for this visit:    1. Attention deficit disorder (ADD) in adult (Primary)  -     amphetamine-dextroamphetamine (Adderall) 15 MG tablet; Take 1 tablet by mouth 2 (Two) Times a Day for 30 days.  Dispense: 60 tablet; Refill: 0  -     amphetamine-dextroamphetamine (Adderall) 15 MG tablet; Take 1 tablet by mouth 2 (Two) Times a Day for 30 days.  Dispense: 60 tablet; Refill: 0  -     amphetamine-dextroamphetamine (Adderall) 15 MG tablet; Take 1 tablet by mouth 2 (Two) Times a Day for 30 days.  Dispense: 60 tablet; Refill: 0    2. Nerve pain  -     topiramate (TOPAMAX) 50 MG tablet; Take 1 tablet by mouth 2 (Two) Times a Day.  Dispense: 60 tablet; Refill: 11    3. Chronic midline low back pain with bilateral sciatica  -     XR Spine Lumbar Complete 4+VW        As part of this patient's treatment plan, I am prescribing stimulants. The patient has been made aware of appropriate use of such medications, including potential for dependence and/or overdose. It has also been made clear that these medications are for use by this patient only, without concomitant use of alcohol or other substances unless prescribed.    Patient has  completed prescribing agreement detailing terms of continued prescribing of controlled substances, including monitoring QUE reports, urine drug screening, and pill counts if necessary. The patient is aware that inappropriate use will result in cessation of prescribing such medications, and possible termination from this practice.    History and physical exam exhibit continued safe and appropriate use of controlled substances.    1.  Controlled substance abuse agreement discussed and copy in record: YES  2.  Discussed:   Risk of addiction: YES   Specific risk of medications:YES   Side effects of medications: YES   Reasonable expectations of the medication: YES  3.  Treatment Objectives:   Discussed management of constipation: YES   Discussed management of other side effects: YES  4.  eKASPER:     QUE report has been reviewed by: Lalito Kaur MD on 12/26/23 in the PDMD in the electronic medical record.  eKASPER has been reviewed and there are no concerns.  Education regarding the treatment of controlled substances has been provided to the patient.  This education includes, but is not limited to, safe use, proper disposal, how to discontinue use, and the possibility of developing dependency and/or addiction to these medications. The patients has been informed of all risks, benefits and alternatives associated with controlled substance use and has given informed consent to receive a controlled substance.     Results/Date of last QUE:  appropriate  5.  Results/Date of last drug screen:  appropriate  6.  Follow up plan:    Follow Up in One Months Time              Continue on current medications as directed    EMR Dictation/Transcription disclaimer:   Some of this note may be an electronic transcription/translation of spoken language to printed text. The electronic translation of spoken language may permit erroneous, or at times, nonsensical words or phrases to be inadvertently transcribed; Although I  have reviewed the note for such errors, some may still exist.          Follow Up   No follow-ups on file.  Patient was given instructions and counseling regarding her condition or for health maintenance advice. Please see specific information pulled into the AVS if appropriate.

## 2024-02-21 ENCOUNTER — OFFICE VISIT (OUTPATIENT)
Dept: OBSTETRICS AND GYNECOLOGY | Age: 34
End: 2024-02-21
Payer: COMMERCIAL

## 2024-02-21 VITALS
BODY MASS INDEX: 29.26 KG/M2 | HEIGHT: 62 IN | WEIGHT: 159 LBS | DIASTOLIC BLOOD PRESSURE: 72 MMHG | SYSTOLIC BLOOD PRESSURE: 114 MMHG

## 2024-02-21 DIAGNOSIS — Z78.9 NON-SMOKER: ICD-10-CM

## 2024-02-21 DIAGNOSIS — N64.3 GALACTORRHEA NOT ASSOCIATED WITH CHILDBIRTH: ICD-10-CM

## 2024-02-21 DIAGNOSIS — N94.10 DYSPAREUNIA, FEMALE: ICD-10-CM

## 2024-02-21 DIAGNOSIS — N94.6 DYSMENORRHEA: ICD-10-CM

## 2024-02-21 DIAGNOSIS — N80.9 ENDOMETRIOSIS: ICD-10-CM

## 2024-02-21 DIAGNOSIS — N92.0 MENORRHAGIA WITH REGULAR CYCLE: Primary | ICD-10-CM

## 2024-02-21 RX ORDER — RELUGOLIX, ESTRADIOL HEMIHYDRATE, AND NORETHINDRONE ACETATE 40; 1; .5 MG/1; MG/1; MG/1
1 TABLET, FILM COATED ORAL DAILY
Qty: 30 TABLET | Refills: 3 | Status: SHIPPED | OUTPATIENT
Start: 2024-02-21

## 2024-02-21 NOTE — PROGRESS NOTES
"Chief Complaint  Follow-up (Pt here for 5mo f/u on menorrhagia and dysmenorrhea after doing trial of ocp and reports she d/c taking ocp because she did not stop bleeding, after she d/c taking them and reports periods since have been just as heavy and painful) and Breast Problem (Pt reports she has experienced galactorrhea bilaterally while in the shower and has not noticed this with nipple stimulation otherwise)    Subjective        Mildred Li presents to [facility]  History of Present Illness    Patient presents today for follow-up  Heavy and painful menses have not improved with oral contraceptives  She has previously used an IUD which caused depression, Depo-Provera, transdermal, and transvaginal contraception without improvement  She did have a laparoscopic exam approximately 11 years ago showing endometriosis  Ultrasound is ordered and is reviewed  Uterus is noted to be retroverted but otherwise normal scan is noted    She has no risk factors for osteoporosis    Objective   Vital Signs:  /72 (BP Location: Right arm, Patient Position: Sitting, Cuff Size: Adult)   Ht 157.5 cm (62.01\")   Wt 72.1 kg (159 lb)   BMI 29.07 kg/m²   Estimated body mass index is 29.07 kg/m² as calculated from the following:    Height as of this encounter: 157.5 cm (62.01\").    Weight as of this encounter: 72.1 kg (159 lb).               Physical Exam  Vitals and nursing note reviewed. Exam conducted with a chaperone present.   Constitutional:       Appearance: Normal appearance.   HENT:      Head: Normocephalic and atraumatic.   Chest:       Abdominal:      General: Abdomen is flat. Bowel sounds are normal.      Palpations: Abdomen is soft.   Musculoskeletal:         General: Normal range of motion.      Cervical back: Normal range of motion and neck supple.   Skin:     General: Skin is warm and dry.   Neurological:      General: No focal deficit present.      Mental Status: She is alert and " oriented to person, place, and time. Mental status is at baseline.   Psychiatric:         Mood and Affect: Mood normal.         Behavior: Behavior normal.         Thought Content: Thought content normal.         Judgment: Judgment normal.        Result Review :                     Assessment and Plan     Diagnoses and all orders for this visit:    1. Menorrhagia with regular cycle (Primary)    2. Dysmenorrhea    3. Dyspareunia, female    4. Galactorrhea not associated with childbirth  -     Prolactin  -     Mammo diagnostic digital tomosynthesis bilateral w CAD  -     US Breast Bilateral Complete    5. Endometriosis  Comments:  prior surgery by Chris at [facility]    6. Non-smoker    Other orders  -     Relugolix-Estradiol-Norethind (Myfembree) 40-1-0.5 MG tablet; Take 1 tablet by mouth Daily.  Dispense: 30 tablet; Refill: 3             Follow Up     Return in about 3 months (around 5/21/2024) for Get Op note from [facility] 2011; Chris Laparoscopy endometrioisis, Telehealth, with me.  Patient was given instructions and counseling regarding her condition or for health maintenance advice. Please see specific information pulled into the AVS if appropriate.     Trial of Myfembree  Get operative summary from [facility]  Follow-up on prolactin and imaging  Call for concerns or questions    Jaspreet Apple MD

## 2024-02-22 ENCOUNTER — PATIENT MESSAGE (OUTPATIENT)
Dept: OBSTETRICS AND GYNECOLOGY | Age: 34
End: 2024-02-22
Payer: COMMERCIAL

## 2024-02-22 LAB — PROLACTIN SERPL-MCNC: 12 NG/ML (ref 4.8–33.4)

## 2024-03-14 ENCOUNTER — APPOINTMENT (OUTPATIENT)
Dept: OTHER | Facility: HOSPITAL | Age: 34
End: 2024-03-14
Payer: COMMERCIAL

## 2024-03-14 ENCOUNTER — HOSPITAL ENCOUNTER (OUTPATIENT)
Dept: ULTRASOUND IMAGING | Facility: HOSPITAL | Age: 34
Discharge: HOME | End: 2024-03-14
Payer: COMMERCIAL

## 2024-03-14 ENCOUNTER — HOSPITAL ENCOUNTER (OUTPATIENT)
Dept: MAMMOGRAPHY | Facility: HOSPITAL | Age: 34
Discharge: HOME | End: 2024-03-14
Payer: COMMERCIAL

## 2024-03-14 PROCEDURE — 77066 DX MAMMO INCL CAD BI: CPT

## 2024-03-14 PROCEDURE — G0279 TOMOSYNTHESIS, MAMMO: HCPCS

## 2024-03-15 ENCOUNTER — TELEPHONE (OUTPATIENT)
Dept: OBSTETRICS AND GYNECOLOGY | Age: 34
End: 2024-03-15
Payer: COMMERCIAL

## 2024-03-15 NOTE — TELEPHONE ENCOUNTER
"Called pt to inform of normal mammogram and she informed me that the Myfembree she was taking is making her feel \"weird\" and wanted to discuss possibly having hysterectomy prior to the end of the year as previously discussed.  [facility] appt made for Monday at 1130 per pt request.   "

## 2024-03-18 ENCOUNTER — OFFICE VISIT (OUTPATIENT)
Dept: INTERNAL MEDICINE | Facility: CLINIC | Age: 34
End: 2024-03-18
Payer: COMMERCIAL

## 2024-03-18 ENCOUNTER — TELEPHONE (OUTPATIENT)
Dept: OBSTETRICS AND GYNECOLOGY | Age: 34
End: 2024-03-18
Payer: COMMERCIAL

## 2024-03-18 ENCOUNTER — TELEMEDICINE (OUTPATIENT)
Dept: OBSTETRICS AND GYNECOLOGY | Age: 34
End: 2024-03-18
Payer: COMMERCIAL

## 2024-03-18 VITALS
OXYGEN SATURATION: 99 % | SYSTOLIC BLOOD PRESSURE: 113 MMHG | HEART RATE: 103 BPM | WEIGHT: 163.8 LBS | BODY MASS INDEX: 30.14 KG/M2 | HEIGHT: 62 IN | TEMPERATURE: 98.6 F | DIASTOLIC BLOOD PRESSURE: 74 MMHG

## 2024-03-18 DIAGNOSIS — N94.6 DYSMENORRHEA: ICD-10-CM

## 2024-03-18 DIAGNOSIS — Z79.899 LONG TERM USE OF DRUG: Primary | ICD-10-CM

## 2024-03-18 DIAGNOSIS — N92.0 MENORRHAGIA WITH REGULAR CYCLE: Primary | ICD-10-CM

## 2024-03-18 DIAGNOSIS — Z78.9 NON-SMOKER: ICD-10-CM

## 2024-03-18 DIAGNOSIS — N94.10 DYSPAREUNIA, FEMALE: ICD-10-CM

## 2024-03-18 DIAGNOSIS — N80.9 ENDOMETRIOSIS: ICD-10-CM

## 2024-03-18 DIAGNOSIS — J01.00 ACUTE MAXILLARY SINUSITIS, RECURRENCE NOT SPECIFIED: ICD-10-CM

## 2024-03-18 DIAGNOSIS — F90.0 ATTENTION DEFICIT HYPERACTIVITY DISORDER, PREDOMINANTLY INATTENTIVE TYPE: ICD-10-CM

## 2024-03-18 PROBLEM — G43.909 MIGRAINE: Status: ACTIVE | Noted: 2017-10-06

## 2024-03-18 LAB
POC AMPHETAMINES: POSITIVE
POC BARBITURATES: NEGATIVE
POC BENZODIAZEPHINES: NEGATIVE
POC COCAINE: NEGATIVE
POC METHADONE: NEGATIVE
POC METHAMPHETAMINE SCREEN URINE: NEGATIVE
POC OPIATES: NEGATIVE
POC OXYCODONE: NEGATIVE
POC PHENCYCLIDINE: NEGATIVE
POC PROPOXYPHENE: NEGATIVE
POC THC: NEGATIVE
POC TRICYCLIC ANTIDEPRESSANTS: NEGATIVE

## 2024-03-18 PROCEDURE — 99214 OFFICE O/P EST MOD 30 MIN: CPT | Performed by: OBSTETRICS & GYNECOLOGY

## 2024-03-18 RX ORDER — GUAIFENESIN 600 MG/1
1200 TABLET, EXTENDED RELEASE ORAL 2 TIMES DAILY
Qty: 40 TABLET | Refills: 0 | Status: SHIPPED | OUTPATIENT
Start: 2024-03-18 | End: 2024-03-28

## 2024-03-18 RX ORDER — SODIUM CHLORIDE, SODIUM LACTATE, POTASSIUM CHLORIDE, CALCIUM CHLORIDE 600; 310; 30; 20 MG/100ML; MG/100ML; MG/100ML; MG/100ML
125 INJECTION, SOLUTION INTRAVENOUS CONTINUOUS
OUTPATIENT
Start: 2024-03-18

## 2024-03-18 RX ORDER — SCOLOPAMINE TRANSDERMAL SYSTEM 1 MG/1
1 PATCH, EXTENDED RELEASE TRANSDERMAL CONTINUOUS
OUTPATIENT
Start: 2024-03-18 | End: 2024-03-21

## 2024-03-18 RX ORDER — SODIUM CHLORIDE 9 MG/ML
40 INJECTION, SOLUTION INTRAVENOUS AS NEEDED
OUTPATIENT
Start: 2024-03-18

## 2024-03-18 RX ORDER — DEXTROAMPHETAMINE SACCHARATE, AMPHETAMINE ASPARTATE, DEXTROAMPHETAMINE SULFATE AND AMPHETAMINE SULFATE 3.75; 3.75; 3.75; 3.75 MG/1; MG/1; MG/1; MG/1
15 TABLET ORAL 2 TIMES DAILY
Qty: 60 TABLET | Refills: 0 | Status: SHIPPED | OUTPATIENT
Start: 2024-04-15 | End: 2024-05-15

## 2024-03-18 RX ORDER — AMOXICILLIN 500 MG/1
500 CAPSULE ORAL 2 TIMES DAILY
Qty: 20 CAPSULE | Refills: 0 | Status: SHIPPED | OUTPATIENT
Start: 2024-03-18 | End: 2024-03-28

## 2024-03-18 RX ORDER — GABAPENTIN 100 MG/1
600 CAPSULE ORAL ONCE
OUTPATIENT
Start: 2024-03-18 | End: 2024-03-18

## 2024-03-18 RX ORDER — DEXTROAMPHETAMINE SACCHARATE, AMPHETAMINE ASPARTATE, DEXTROAMPHETAMINE SULFATE AND AMPHETAMINE SULFATE 3.75; 3.75; 3.75; 3.75 MG/1; MG/1; MG/1; MG/1
15 TABLET ORAL 2 TIMES DAILY
Qty: 60 TABLET | Refills: 0 | Status: SHIPPED | OUTPATIENT
Start: 2024-05-13 | End: 2024-06-12

## 2024-03-18 RX ORDER — METHYLPREDNISOLONE 4 MG/1
TABLET ORAL
Qty: 21 TABLET | Refills: 0 | Status: SHIPPED | OUTPATIENT
Start: 2024-03-18

## 2024-03-18 RX ORDER — ACETAMINOPHEN 500 MG
1000 TABLET ORAL ONCE
OUTPATIENT
Start: 2024-03-18 | End: 2024-03-18

## 2024-03-18 RX ORDER — SODIUM CHLORIDE 0.9 % (FLUSH) 0.9 %
10 SYRINGE (ML) INJECTION EVERY 12 HOURS SCHEDULED
OUTPATIENT
Start: 2024-03-18

## 2024-03-18 RX ORDER — PHENAZOPYRIDINE HYDROCHLORIDE 100 MG/1
200 TABLET, FILM COATED ORAL ONCE
OUTPATIENT
Start: 2024-03-18 | End: 2024-03-18

## 2024-03-18 RX ORDER — SODIUM CHLORIDE 0.9 % (FLUSH) 0.9 %
10 SYRINGE (ML) INJECTION AS NEEDED
OUTPATIENT
Start: 2024-03-18

## 2024-03-18 RX ORDER — DEXTROAMPHETAMINE SACCHARATE, AMPHETAMINE ASPARTATE, DEXTROAMPHETAMINE SULFATE AND AMPHETAMINE SULFATE 3.75; 3.75; 3.75; 3.75 MG/1; MG/1; MG/1; MG/1
15 TABLET ORAL 2 TIMES DAILY
Qty: 60 TABLET | Refills: 0 | Status: SHIPPED | OUTPATIENT
Start: 2024-03-18 | End: 2024-04-17

## 2024-03-18 NOTE — TELEPHONE ENCOUNTER
Called to inform pt of sx date and time as well as PAT, son was currently being seen and asked to call back tomorrow with this information.

## 2024-03-18 NOTE — PROGRESS NOTES
Subjective     Chief Complaint   Patient presents with    ADD    Sinus Problem       ADD    Sinus Problem        Mildred Li is a 33 y.o. female who presents for a recheck of ADHD.  she reports compliance with medication regimen.  she reports No side effects. she also reports symptoms have improved since start of medication.  No evidence of diversion or abuse has been noted with this patient.  Patient reports that the medication allows her to continue to be focused and maintain concentration at work.  she is happy with the current dose and does not feel that she needs to do any changes at this time.    Current symptoms include:   Inattention: None were reported  Hyperactivity: None were reported  Impulsivity: None were reported  Mental Health: No symptoms of depression, anxiety, bipolar disease or psychosis or conduct disorders. were reported    The patient states she has significant head congestion with mild associated cough and congestion.   Drainage has been yellowish green in color, as well as any coughed up sputum..  There has been some facial pain and pressure.  Patient reports no fever, myalgias, nausea, vomiting or diarrhea associated with this at this time.  Patient reports that this has been going on for 4 days at this point, and would like something to help with their sinusitis at this time.      Patient's PMR from outside medical facility reviewed and noted.      Past Medical History:   Past Medical History:   Diagnosis Date    Abnormal Pap smear of cervix     ADD (attention deficit disorder)     Allergic     Seasonal    Anemia     When pregnant, along with the year following.    Anxiety     Breast cyst     Colon polyp     Depression     Endometriosis     Gastritis, chronic     GERD (gastroesophageal reflux disease)     Gastritis    Migraine      Past Surgical History:  Past Surgical History:   Procedure Laterality Date    BREAST BIOPSY Right     CHOLECYSTECTOMY      COLONOSCOPY      Had  2-3 times in last decade.    ENDOMETRIAL ABLATION      Age of 22    HERNIA REPAIR      2 times    TONSILLECTOMY      UMBILICAL HERNIA REPAIR      Age of 10 or 11    WISDOM TOOTH EXTRACTION       Social History:  reports that she has never smoked. She has never used smokeless tobacco. She reports current alcohol use. She reports that she does not use drugs.    Family History: family history includes Arthritis in her maternal grandfather and maternal grandmother; Breast cancer in her paternal grandmother; Cancer in her maternal grandfather and paternal grandmother; Colon cancer in her cousin; Depression in her brother; Diabetes in her paternal grandmother; Early death in her paternal grandmother; Hearing loss in her maternal grandmother; Heart disease in her maternal grandfather and paternal grandfather; Hyperlipidemia in her maternal grandfather, maternal grandmother, mother, and paternal grandfather; Miscarriages / Stillbirths in her maternal grandmother and mother; Ovarian cancer in her maternal aunt; Uterine cancer in her cousin and maternal aunt.      Allergies:  Allergies   Allergen Reactions    Buspar [Buspirone] Mental Status Change    Morphine And Related Itching and Other (See Comments)     Tried to scratch eyes out.    Norgestimate-Eth Estradiol Nausea And Vomiting     Medications:  Prior to Admission medications    Medication Sig Start Date End Date Taking? Authorizing Provider   cyclobenzaprine (FLEXERIL) 10 MG tablet Take 1 tablet by mouth 3 (Three) Times a Day As Needed for Muscle Spasms. 9/25/23  Yes Lalito Kaur MD   escitalopram (Lexapro) 10 MG tablet Take 1 tablet by mouth Daily. 9/25/23  Yes Lalito Kaur MD   galcanezumab-gnlm (Emgality) 120 MG/ML auto-injector pen Inject 1 mL under the skin into the appropriate area as directed Every 30 (Thirty) Days. 9/25/23  Yes Lalito Kaur MD   metaxalone (Skelaxin) 800 MG tablet Take 1 tablet by mouth 3 (Three) Times a  "Day As Needed for Muscle Spasms. 9/26/23  Yes Jaspreet Apple MD   Relugolix-Estradiol-Norethind (Myfembree) 40-1-0.5 MG tablet Take 1 tablet by mouth Daily. 2/21/24  Yes Jaspreet Apple MD   Rimegepant Sulfate (Nurtec) 75 MG tablet dispersible tablet Take 1 tablet by mouth Every Other Day. 9/25/23  Yes Lalito Kaur MD   SUMAtriptan (IMITREX) 100 MG tablet Take 1 tablet by mouth Every 2 (Two) Hours As Needed for Migraine. Take one tablet at onset of headache. May repeat dose one time in 2 hours if headache not relieved. 9/25/23  Yes Lalito Kaur MD   topiramate (TOPAMAX) 50 MG tablet Take 1 tablet by mouth 2 (Two) Times a Day. 12/26/23  Yes Lalito Kaur MD           Review of systems   negative unless otherwise specified above in HPI    Objective     Vital Signs: /74 (BP Location: Left arm, Patient Position: Sitting, Cuff Size: Adult)   Pulse 103   Temp 98.6 °F (37 °C) (Infrared)   Ht 157.5 cm (62\")   Wt 74.3 kg (163 lb 12.8 oz)   LMP 02/08/2024 (Approximate)   SpO2 99%   BMI 29.96 kg/m²     Physical Exam  Vitals and nursing note reviewed.   Constitutional:       General: She is not in acute distress.     Appearance: Normal appearance.   HENT:      Head: Normocephalic.   Eyes:      Extraocular Movements: Extraocular movements intact.      Pupils: Pupils are equal, round, and reactive to light.   Cardiovascular:      Rate and Rhythm: Normal rate and regular rhythm.      Heart sounds: Normal heart sounds. No murmur heard.  Pulmonary:      Effort: Pulmonary effort is normal. No respiratory distress.      Breath sounds: Normal breath sounds. No rhonchi or rales.   Abdominal:      General: Abdomen is flat. Bowel sounds are normal.      Palpations: Abdomen is soft.   Neurological:      General: No focal deficit present.      Mental Status: She is alert.       BMI is >= 25 and <30. (Overweight) The following options were offered after discussion;: exercise " counseling/recommendations and nutrition counseling/recommendations             Results Reviewed:  Glucose   Date Value Ref Range Status   11/01/2022 84 70 - 99 mg/dL Final     BUN   Date Value Ref Range Status   11/01/2022 9 6 - 20 mg/dL Final     Creatinine   Date Value Ref Range Status   11/01/2022 0.79 0.57 - 1.00 mg/dL Final     Sodium   Date Value Ref Range Status   11/01/2022 140 134 - 144 mmol/L Final     Potassium   Date Value Ref Range Status   11/01/2022 3.8 3.5 - 5.2 mmol/L Final     Chloride   Date Value Ref Range Status   11/01/2022 104 96 - 106 mmol/L Final     Total CO2   Date Value Ref Range Status   11/01/2022 24 20 - 29 mmol/L Final     Calcium   Date Value Ref Range Status   11/01/2022 9.2 8.7 - 10.2 mg/dL Final     ALT (SGPT)   Date Value Ref Range Status   11/01/2022 14 0 - 32 IU/L Final     AST (SGOT)   Date Value Ref Range Status   11/01/2022 13 0 - 40 IU/L Final     WBC   Date Value Ref Range Status   02/22/2023 8.2 3.4 - 10.8 x10E3/uL Final     Hematocrit   Date Value Ref Range Status   02/22/2023 41.6 34.0 - 46.6 % Final     Platelets   Date Value Ref Range Status   02/22/2023 279 150 - 450 x10E3/uL Final                 Assessment / Plan     Assessment/Plan:   Diagnosis Plan   1. Long term use of drug  POC Urine Drug Screen, Triage      2. Attention deficit hyperactivity disorder, predominantly inattentive type  amphetamine-dextroamphetamine (Adderall) 15 MG tablet    amphetamine-dextroamphetamine (Adderall) 15 MG tablet    amphetamine-dextroamphetamine (Adderall) 15 MG tablet      3. Acute maxillary sinusitis, recurrence not specified  amoxicillin (AMOXIL) 500 MG capsule    methylPREDNISolone (MEDROL) 4 MG dose pack    guaiFENesin (Mucinex) 600 MG 12 hr tablet            No follow-ups on file. unless patient needs to be seen sooner or acute issues arise.      I have discussed the patient results/orders and and plan/recommendation with them at today's visit.      Signed by:    Lalito  Zana Kaur MD Date: 03/18/24

## 2024-03-18 NOTE — PROGRESS NOTES
Chief Complaint  No chief complaint on file.    Endometriosis    Subjective        Patient presents today to follow-up on Myfembree  She is having side effects and wishes to discontinue  She now desires definitive therapy  She has failed endometrial ablation as well as multiple contraceptive methods  Workup today has been unrevealing  We have been unable to get her operative summaries from her endometriosis discovery    Risks, benefits, and alternatives of a hysterectomy were discussed with the patient in detail. Intraoperative risks of bleeding and damage to surrounding organs, including but not limited to intestine, bladder and ureter, were explained. Management of these were also explained. Postoperative complications such as infection, pneumonia, DVT, and bleeding were explained. Cuff dehiscence and cuff hematoma/cellulitis were also explained. The importance of compliance with postoperative restrictions was discussed. Long term effect regarding pelvic organ prolapse was also explained. Laparoscopic approach with use of the da Rianna robotic system was explained. Indications for conversion to a laparotomy were discussed.     Ovarian conservation/removal was also discussed. Menopausal symptoms associated with adnexectomy were explained in detail. Reduction in the risk of ovarian cancer was discussed. Unknown benefits of ovarian conservation were also discussed.     Removal of fallopian tubes regardless of ovarian removal was discussed and patient verbalized understanding.    All of the patient's questions were answered to her satisfaction. She was encouraged to return for an additional appointment if she had further questions. She verbalized understanding of the above and wished to proceed with the outlined plan.    She desires ovarian conservation    Of note, she has had, what sounds like, 3 umbilical hernia repairs  Left upper quadrant insertion will be required         Mildred Li presents to [facility]  "[facility]  History of Present Illness  Objective   Vital Signs:   There were no vitals taken for this visit.    Estimated body mass index is 29.07 kg/m² as calculated from the following:    Height as of 2/21/24: 157.5 cm (62.01\").    Weight as of 2/21/24: 72.1 kg (159 lb).     Virtual Visit Physical Exam  Result Review :                   Assessment and Plan      Diagnoses and all orders for this visit:    1. Menorrhagia with regular cycle (Primary)  -     Case Request  -     sodium chloride 0.9 % flush 10 mL  -     sodium chloride 0.9 % flush 10 mL  -     sodium chloride 0.9 % infusion 40 mL  -     lactated ringers infusion  -     phenazopyridine (PYRIDIUM) tablet 200 mg  -     acetaminophen (TYLENOL) tablet 1,000 mg  -     gabapentin (NEURONTIN) capsule 600 mg  -     scopolamine patch 1 mg/72 hr  -     ceFAZolin (ANCEF) 2,000 mg in sodium chloride 0.9 % 100 mL IVPB    2. Dysmenorrhea  -     Case Request  -     sodium chloride 0.9 % flush 10 mL  -     sodium chloride 0.9 % flush 10 mL  -     sodium chloride 0.9 % infusion 40 mL  -     lactated ringers infusion  -     phenazopyridine (PYRIDIUM) tablet 200 mg  -     acetaminophen (TYLENOL) tablet 1,000 mg  -     gabapentin (NEURONTIN) capsule 600 mg  -     scopolamine patch 1 mg/72 hr  -     ceFAZolin (ANCEF) 2,000 mg in sodium chloride 0.9 % 100 mL IVPB    3. Dyspareunia, female  -     Case Request  -     sodium chloride 0.9 % flush 10 mL  -     sodium chloride 0.9 % flush 10 mL  -     sodium chloride 0.9 % infusion 40 mL  -     lactated ringers infusion  -     phenazopyridine (PYRIDIUM) tablet 200 mg  -     acetaminophen (TYLENOL) tablet 1,000 mg  -     gabapentin (NEURONTIN) capsule 600 mg  -     scopolamine patch 1 mg/72 hr  -     ceFAZolin (ANCEF) 2,000 mg in sodium chloride 0.9 % 100 mL IVPB    4. Endometriosis  -     Case Request  -     sodium chloride 0.9 % flush 10 mL  -     sodium chloride 0.9 % flush 10 mL  -     sodium chloride 0.9 % " infusion 40 mL  -     lactated ringers infusion  -     phenazopyridine (PYRIDIUM) tablet 200 mg  -     acetaminophen (TYLENOL) tablet 1,000 mg  -     gabapentin (NEURONTIN) capsule 600 mg  -     scopolamine patch 1 mg/72 hr  -     ceFAZolin (ANCEF) 2,000 mg in sodium chloride 0.9 % 100 mL IVPB    5. Non-smoker    Other orders  -     Follow Anesthesia Guidelines / Protocol; Future  -     Follow Anesthesia Guidelines / Protocol; Standing  -     Chlorhexidine Skin Prep; Future  -     Obtain Informed Consent; Standing  -     Verify NPO Status; Standing  -     Verify The Time Patient Completed ERAS Hydration Drink; Standing  -     Place Sequential Compression Device; Standing  -     Verify / Perform Chlorhexidine Skin Prep; Standing  -     Type & Screen; Standing  -     Insert Peripheral IV; Standing  -     Saline Lock & Maintain IV Access; Standing        Follow Up     No follow-ups on file.  Patient was given instructions and counseling regarding her condition or for health maintenance advice. Please see specific information pulled into the AVS if appropriate.     Mode of Visit: Video  Location of patient: other: Work  [place] [facility] [place]  You have chosen to receive care through a telehealth visit.  The patient has signed the video visit consent form.  The visit included audio and video interaction. No technical issues occurred during this visit.     Plan da Rianna hysterectomy, bilateral salpingectomy with left upper quadrant insertion    Jaspreet Apple MD

## 2024-03-22 NOTE — TELEPHONE ENCOUNTER
Called and informed pt of her sx date of 05/10/2024 and needs to arrive at 0500 and to be NPO after midnight, pt also made aware of pre-op appt 04/24/2024 at 1045 and PAT to follow, pt voiced understanding.

## 2024-04-24 ENCOUNTER — OFFICE VISIT (OUTPATIENT)
Age: 34
End: 2024-04-24
Payer: COMMERCIAL

## 2024-04-24 ENCOUNTER — PRE-ADMISSION TESTING (OUTPATIENT)
Dept: PREADMISSION TESTING | Facility: HOSPITAL | Age: 34
End: 2024-04-24
Payer: COMMERCIAL

## 2024-04-24 VITALS
HEIGHT: 62 IN | DIASTOLIC BLOOD PRESSURE: 94 MMHG | WEIGHT: 169 LBS | SYSTOLIC BLOOD PRESSURE: 146 MMHG | BODY MASS INDEX: 31.1 KG/M2

## 2024-04-24 VITALS
OXYGEN SATURATION: 100 % | HEIGHT: 63 IN | DIASTOLIC BLOOD PRESSURE: 79 MMHG | HEART RATE: 83 BPM | SYSTOLIC BLOOD PRESSURE: 127 MMHG | BODY MASS INDEX: 30.47 KG/M2 | RESPIRATION RATE: 16 BRPM | WEIGHT: 171.96 LBS

## 2024-04-24 DIAGNOSIS — N94.10 DYSPAREUNIA, FEMALE: ICD-10-CM

## 2024-04-24 DIAGNOSIS — N92.0 MENORRHAGIA WITH REGULAR CYCLE: Primary | ICD-10-CM

## 2024-04-24 DIAGNOSIS — N80.9 ENDOMETRIOSIS: ICD-10-CM

## 2024-04-24 DIAGNOSIS — N94.6 DYSMENORRHEA: ICD-10-CM

## 2024-04-24 DIAGNOSIS — Z78.9 NON-SMOKER: ICD-10-CM

## 2024-04-24 LAB
DEPRECATED RDW RBC AUTO: 45 FL (ref 37–54)
ERYTHROCYTE [DISTWIDTH] IN BLOOD BY AUTOMATED COUNT: 12.9 % (ref 12.3–15.4)
HCT VFR BLD AUTO: 41 % (ref 34–46.6)
HGB BLD-MCNC: 13.1 G/DL (ref 12–15.9)
MCH RBC QN AUTO: 30.3 PG (ref 26.6–33)
MCHC RBC AUTO-ENTMCNC: 32 G/DL (ref 31.5–35.7)
MCV RBC AUTO: 94.7 FL (ref 79–97)
PLATELET # BLD AUTO: 283 10*3/MM3 (ref 140–450)
PMV BLD AUTO: 10.3 FL (ref 6–12)
RBC # BLD AUTO: 4.33 10*6/MM3 (ref 3.77–5.28)
WBC NRBC COR # BLD AUTO: 7.03 10*3/MM3 (ref 3.4–10.8)

## 2024-04-24 PROCEDURE — 36415 COLL VENOUS BLD VENIPUNCTURE: CPT

## 2024-04-24 PROCEDURE — 85027 COMPLETE CBC AUTOMATED: CPT

## 2024-04-24 NOTE — H&P (VIEW-ONLY)
Jaspreet Apple MD  [facility]  [address]  [place] [address]  Office [phone]  Fax [phone]      [facility]  Mildred Li  1990  [id number]  [id number]  2024    Subjective   Mildred Li is a 33 y.o. year old female  who presents for surgery due to DUB, Chronic Pelvic Pain, and Menorrhagia.  Failed conservative measures include NSAIDS and OCPs.    COEMIG Procedure yes  Rating of Pain 8  Effect on daily activities significant    HPI    Risks, benefits, and alternatives of a hysterectomy were discussed with the patient in detail. Intraoperative risks of bleeding and damage to surrounding organs, including but not limited to intestine, bladder and ureter, were explained. Management of these were also explained. Postoperative complications such as infection, pneumonia, DVT, and bleeding were explained. Cuff dehiscence and cuff hematoma/cellulitis were also explained. The importance of compliance with postoperative restrictions was discussed. Long term effect regarding pelvic organ prolapse was also explained. Laparoscopic approach with use of the da Rainna robotic system was explained. Indications for conversion to a laparotomy were discussed.     Ovarian conservation/removal was also discussed. Menopausal symptoms associated with adnexectomy were explained in detail. Reduction in the risk of ovarian cancer was discussed. Unknown benefits of ovarian conservation were also discussed.     Removal of fallopian tubes regardless of ovarian removal was discussed and patient verbalized understanding.    All of the patient's questions were answered to her satisfaction. She was encouraged to return for an additional appointment if she had further questions. She verbalized understanding of the above and wished to proceed with the outlined plan.      Patient desires bilateral ovarian conservation    Past Medical History:   Diagnosis Date    Abnormal Pap  smear of cervix     ADD (attention deficit disorder)     Allergic     Seasonal    Anemia     When pregnant, along with the year following.    Anxiety     Breast cyst     Colon polyp     Depression     Endometriosis     Gastritis, chronic     GERD (gastroesophageal reflux disease)     Gastritis    Migraine     PONV (postoperative nausea and vomiting)        Past Surgical History:   Procedure Laterality Date    BREAST BIOPSY Right     COLONOSCOPY      Had 2-3 times in last decade.    ENDOMETRIAL ABLATION      Age of 22    HERNIA REPAIR      2 times    LAPAROSCOPIC CHOLECYSTECTOMY      TONSILLECTOMY      UMBILICAL HERNIA REPAIR      Age of 10 or 11    WISDOM TOOTH EXTRACTION           Current Outpatient Medications:     amphetamine-dextroamphetamine (Adderall) 15 MG tablet, Take 1 tablet by mouth 2 (Two) Times a Day for 30 days., Disp: 60 tablet, Rfl: 0    [START ON 5/13/2024] amphetamine-dextroamphetamine (Adderall) 15 MG tablet, Take 1 tablet by mouth 2 (Two) Times a Day for 30 days., Disp: 60 tablet, Rfl: 0    cyclobenzaprine (FLEXERIL) 10 MG tablet, Take 1 tablet by mouth 3 (Three) Times a Day As Needed for Muscle Spasms., Disp: 90 tablet, Rfl: 11    escitalopram (Lexapro) 10 MG tablet, Take 1 tablet by mouth Daily., Disp: 90 tablet, Rfl: 3    galcanezumab-gnlm (Emgality) 120 MG/ML auto-injector pen, Inject 1 mL under the skin into the appropriate area as directed Every 30 (Thirty) Days., Disp: 1.12 mL, Rfl: 11    metaxalone (Skelaxin) 800 MG tablet, Take 1 tablet by mouth 3 (Three) Times a Day As Needed for Muscle Spasms., Disp: 30 tablet, Rfl: 3    Rimegepant Sulfate (Nurtec) 75 MG tablet dispersible tablet, Take 1 tablet by mouth Every Other Day. (Patient taking differently: Take 1 tablet by mouth Every Other Day As Needed.), Disp: 15 tablet, Rfl: 11    SUMAtriptan (IMITREX) 100 MG tablet, Take 1 tablet by mouth Every 2 (Two) Hours As Needed for Migraine. Take one tablet at onset of headache. May repeat dose one  time in 2 hours if headache not relieved., Disp: 10 tablet, Rfl: 11    topiramate (TOPAMAX) 50 MG tablet, Take 1 tablet by mouth 2 (Two) Times a Day., Disp: 60 tablet, Rfl: 11    Allergies   Allergen Reactions    Buspar [Buspirone] Mental Status Change    Morphine And Related Itching and Other (See Comments)     Tried to scratch eyes out.    Norgestimate-Eth Estradiol Nausea And Vomiting       Family History   Problem Relation Age of Onset    Breast cancer Paternal Grandmother     Cancer Paternal Grandmother         Breast    Diabetes Paternal Grandmother     Early death Paternal Grandmother         32 years of age    Arthritis Maternal Grandfather     Cancer Maternal Grandfather         Skin and Pancreatic    Heart disease Maternal Grandfather     Hyperlipidemia Maternal Grandfather     Ovarian cancer Maternal Aunt     Uterine cancer Maternal Aunt     Colon cancer Cousin     Uterine cancer Cousin     Hyperlipidemia Mother     Miscarriages / Stillbirths Mother     Arthritis Maternal Grandmother     Hearing loss Maternal Grandmother     Hyperlipidemia Maternal Grandmother     Miscarriages / Stillbirths Maternal Grandmother     Heart disease Paternal Grandfather     Hyperlipidemia Paternal Grandfather     Depression Brother        Social History     Socioeconomic History    Marital status:    Tobacco Use    Smoking status: Never    Smokeless tobacco: Never   Vaping Use    Vaping status: Never Used   Substance and Sexual Activity    Alcohol use: Yes     Comment: Occasionally    Drug use: No    Sexual activity: Yes     Partners: Male     Birth control/protection: Condom       OB History    Para Term  AB Living   2 2 2 0 0 2   SAB IAB Ectopic Molar Multiple Live Births   0 0 0 0 0 2      # Outcome Date GA Lbr Tr/2nd Weight Sex Type Anes PTL Lv   2 Term 20 40w0d  3997 g (8 lb 13 oz) F Vag-Spont   JAKE   1 Term 09/15/17 38w0d  3090 g (6 lb 13 oz) M Vag-Spont   JAKE       Review of Systems    Genitourinary:  Positive for menstrual problem, pelvic pain and vaginal bleeding.   All other systems reviewed and are negative.         Objective   Vitals:    04/24/24 1000   BP: 146/94       Physical Exam  Constitutional:       General: She is not in acute distress.     Appearance: Normal appearance. She is not toxic-appearing.   HENT:      Head: Normocephalic and atraumatic.      Nose: Nose normal.   Neurological:      General: No focal deficit present.      Mental Status: She is alert.   Psychiatric:         Mood and Affect: Mood normal.         Behavior: Behavior normal.         Thought Content: Thought content normal.         Judgment: Judgment normal.            Assessment & Plan   Assessment/Plan:  Diagnoses and all orders for this visit:    1. Menorrhagia with regular cycle (Primary)    2. Dysmenorrhea    3. Dyspareunia, female    4. Endometriosis    5. Non-smoker        The risks, benefits, and alternatives of the procedure; along with the risks of anesthesia was discussed in full with the patient and all questions were answered    Da Rianna hysterectomy with bilateral salpingectomy with left upper quadrant insertion approach    Jaspreet Apple MD

## 2024-04-24 NOTE — DISCHARGE INSTRUCTIONS
Preparing for Surgery  Follow these instructions before the procedure:  Several days or weeks before your procedure      Ask your health care provider about:  Changing or stopping your regular medicines. This is especially important if you are taking diabetes medicines or blood thinners.  Taking medicines such as aspirin and ibuprofen. These medicines can thin your blood. Do not take these medicines unless your health care provider tells you to take them.  Taking over-the-counter medicines, vitamins, herbs, and supplements.    Contact your surgeon if you:  Develop a fever of more than 100.4°F (38°C) or other feelings of illness during the 48 hours before your surgery.  Have symptoms that get worse.  Have questions or concerns about your surgery.  If you are going home the same day of your surgery you will need to arrange for a responsible adult, age 18 years old or older, to drive you home from the hospital and stay with you for 24 hours. Verification of the  will be made prior to any procedure requiring sedation. You may not go home in a taxi or any form of public transportation by yourself.     Day before your procedure    24 hours before your procedure DO NOT drink alcoholic beverages or smoke.  24 hours before your procedure STOP taking Erectile Dysfunction medication (i.e.,Cialis, Viagra)   You may be asked to shower with a germ-killing soap.  Day of your procedure       8 hours before your procedure STOP all food, any dairy products, and full liquids. This includes hard candy, chewing gum or mints. This is extremely important to prevent serious complications.     Up to 2 hours before your scheduled arrival time, you may have clear liquids no cream, powder, or pulp of any kind. Safe options are water, black coffee, plain tea, soda, Gatorade/Powerade, clear broth, apple juice.    2 hours before your scheduled arrival time, STOP drinking clear liquids.    You may need to take another shower with a  germ-killing soap before you leave home in the morning. Do not use perfumes, colognes, or body lotions.  Wear comfortable loose-fitting clothing.  Remove all jewelry including body piercing and rings, dark colored nail polish, and make up prior to arrival at the hospital. Leave all valuables at home.   Bring your hearing aids if you rely on them.  Do not wear contact lenses. If you wear eyeglasses remember to bring a case to store them in while you are in surgery.  Do not use denture adhesives since you will be asked to remove them during your surgery.    You do not need to bring your home medications into the hospital.   Bring your sleep apnea device with you on the day of your surgery (if this applies to you).  If you wear portable oxygen, bring it with you.   If you are staying overnight, you may bring a bag of items you may need such as slippers, robe and a change of clothes for your discharge. You may want to leave these items in the car until you are ready for them since your family will take your belongings when you leave the pre-operative area.  Arrive at the hospital as scheduled by the office. You will be asked to arrive 2 hours prior to your surgery time in order to prepare for your procedure.  When you arrive at the hospital  Go to the registration desk located at the main entrance of the hospital.  After registration is completed, you will be given a beeper and a sticker sheet. Take the stickers to Outpatient Surgery and place in the tray at the end of the desk to notify the staff that you have arrived and registered.   Return to the lobby to wait. You are not always called back according to the time of arrival but rather the time your doctor will be ready.  When your beeper lights up and vibrates proceed through the double doors, under the stairs, and a member of the Outpatient Surgery staff will escort you to your preoperative room.   How to Use Chlorhexidine Before Surgery  Chlorhexidine gluconate  (CHG) is a germ-killing (antiseptic) solution that is used to clean the skin. It can get rid of the bacteria that normally live on the skin and can keep them away for about 24 hours. To clean your skin with CHG, you may be given:  A CHG solution to use in the shower or as part of a sponge bath.  A prepackaged cloth that contains CHG.  Cleaning your skin with CHG may help lower the risk for infection:  While you are staying in the intensive care unit of the hospital.  If you have a vascular access, such as a central line, to provide short-term or long-term access to your veins.  If you have a catheter to drain urine from your bladder.  If you are on a ventilator. A ventilator is a machine that helps you breathe by moving air in and out of your lungs.  After surgery.  What are the risks?  Risks of using CHG include:  A skin reaction.  Hearing loss, if CHG gets in your ears and you have a perforated eardrum.  Eye injury, if CHG gets in your eyes and is not rinsed out.  The CHG product catching fire.  Make sure that you avoid smoking and flames after applying CHG to your skin.  Do not use CHG:  If you have a chlorhexidine allergy or have previously reacted to chlorhexidine.  On babies younger than 2 months of age.  How to use CHG solution  Use CHG only as told by your health care provider, and follow the instructions on the label.  Use the full amount of CHG as directed. Usually, this is one bottle.  During a shower    Follow these steps when using CHG solution during a shower (unless your health care provider gives you different instructions):  Start the shower.  Use your normal soap and shampoo to wash your face and hair.  Turn off the shower or move out of the shower stream.  Pour the CHG onto a clean washcloth. Do not use any type of brush or rough-edged sponge.  Starting at your neck, lather your body down to your toes. Make sure you follow these instructions:  If you will be having surgery, pay special attention  to the part of your body where you will be having surgery. Scrub this area for at least 1 minute.  Do not use CHG on your head or face. If the solution gets into your ears or eyes, rinse them well with water.  Avoid your genital area.  Avoid any areas of skin that have broken skin, cuts, or scrapes.  Scrub your back and under your arms. Make sure to wash skin folds.  Let the lather sit on your skin for 1-2 minutes or as long as told by your health care provider.  Thoroughly rinse your entire body in the shower. Make sure that all body creases and crevices are rinsed well.  Dry off with a clean towel. Do not put any substances on your body afterward--such as powder, lotion, or perfume--unless you are told to do so by your health care provider. Only use lotions that are recommended by the .  Put on clean clothes or pajamas.  If it is the night before your surgery, sleep in clean sheets.     During a sponge bath  Follow these steps when using CHG solution during a sponge bath (unless your health care provider gives you different instructions):  Use your normal soap and shampoo to wash your face and hair.  Pour the CHG onto a clean washcloth.  Starting at your neck, lather your body down to your toes. Make sure you follow these instructions:  If you will be having surgery, pay special attention to the part of your body where you will be having surgery. Scrub this area for at least 1 minute.  Do not use CHG on your head or face. If the solution gets into your ears or eyes, rinse them well with water.  Avoid your genital area.  Avoid any areas of skin that have broken skin, cuts, or scrapes.  Scrub your back and under your arms. Make sure to wash skin folds.  Let the lather sit on your skin for 1-2 minutes or as long as told by your health care provider.  Using a different clean, wet washcloth, thoroughly rinse your entire body. Make sure that all body creases and crevices are rinsed well.  Dry off with a  clean towel. Do not put any substances on your body afterward--such as powder, lotion, or perfume--unless you are told to do so by your health care provider. Only use lotions that are recommended by the .  Put on clean clothes or pajamas.  If it is the night before your surgery, sleep in clean sheets.  How to use CHG prepackaged cloths  Only use CHG cloths as told by your health care provider, and follow the instructions on the label.  Use the CHG cloth on clean, dry skin.  Do not use the CHG cloth on your head or face unless your health care provider tells you to.  When washing with the CHG cloth:  Avoid your genital area.  Avoid any areas of skin that have broken skin, cuts, or scrapes.  Before surgery    Follow these steps when using a CHG cloth to clean before surgery (unless your health care provider gives you different instructions):  Using the CHG cloth, vigorously scrub the part of your body where you will be having surgery. Scrub using a back-and-forth motion for 3 minutes. The area on your body should be completely wet with CHG when you are done scrubbing.  Do not rinse. Discard the cloth and let the area air-dry. Do not put any substances on the area afterward, such as powder, lotion, or perfume.  Put on clean clothes or pajamas.  If it is the night before your surgery, sleep in clean sheets.     For general bathing  Follow these steps when using CHG cloths for general bathing (unless your health care provider gives you different instructions).  Use a separate CHG cloth for each area of your body. Make sure you wash between any folds of skin and between your fingers and toes. Wash your body in the following order, switching to a new cloth after each step:  The front of your neck, shoulders, and chest.  Both of your arms, under your arms, and your hands.  Your stomach and groin area, avoiding the genitals.  Your right leg and foot.  Your left leg and foot.  The back of your neck, your back, and  your buttocks.  Do not rinse. Discard the cloth and let the area air-dry. Do not put any substances on your body afterward--such as powder, lotion, or perfume--unless you are told to do so by your health care provider. Only use lotions that are recommended by the .  Put on clean clothes or pajamas.  Contact a health care provider if:  Your skin gets irritated after scrubbing.  You have questions about using your solution or cloth.  You swallow any chlorhexidine. Call your local poison control center (1-618.513.2321 in the [place]  Get help right away if:  Your eyes itch badly, or they become very red or swollen.  Your skin itches badly and is red or swollen.  Your hearing changes.  You have trouble seeing.  You have swelling or tingling in your mouth or throat.  You have trouble breathing.  These symptoms may represent a serious problem that is an emergency. Do not wait to see if the symptoms will go away. Get medical help right away. Call your local emergency services (353 in the [place] Do not drive yourself to the hospital.  Summary  Chlorhexidine gluconate (CHG) is a germ-killing (antiseptic) solution that is used to clean the skin. Cleaning your skin with CHG may help to lower your risk for infection.  You may be given CHG to use for bathing. It may be in a bottle or in a prepackaged cloth to use on your skin. Carefully follow your health care provider's instructions and the instructions on the product label.  Do not use CHG if you have a chlorhexidine allergy.  Contact your health care provider if your skin gets irritated after scrubbing.  This information is not intended to replace advice given to you by your health care provider. Make sure you discuss any questions you have with your health care provider.  Document Revised: 04/17/2023 Document Reviewed: 02/28/2022  Elsevier Patient Education © 2023 [employer]

## 2024-04-24 NOTE — PROGRESS NOTES
Chief Complaint  Pre-op Exam (Pt here for preop appt, she is scheduled for TLH/BS 5/10/2024, she is scheduled for PAT following this appt, consents have not been signed)    Subjective        Mildred Li presents to [facility]  History of Present Illness    Patient presents today for preoperative counseling  All of her questions were answered to her liking  She desires definitive therapy for abnormal menses unresponsive to conservative means    Risks, benefits, and alternatives of a hysterectomy were discussed with the patient in detail. Intraoperative risks of bleeding and damage to surrounding organs, including but not limited to intestine, bladder and ureter, were explained. Management of these were also explained. Postoperative complications such as infection, pneumonia, DVT, and bleeding were explained. Cuff dehiscence and cuff hematoma/cellulitis were also explained. The importance of compliance with postoperative restrictions was discussed. Long term effect regarding pelvic organ prolapse was also explained. Laparoscopic approach with use of the da Rianna robotic system was explained. Indications for conversion to a laparotomy were discussed.     Ovarian conservation/removal was also discussed. Menopausal symptoms associated with adnexectomy were explained in detail. Reduction in the risk of ovarian cancer was discussed. Unknown benefits of ovarian conservation were also discussed.     Removal of fallopian tubes regardless of ovarian removal was discussed and patient verbalized understanding.    All of the patient's questions were answered to her satisfaction. She was encouraged to return for an additional appointment if she had further questions. She verbalized understanding of the above and wished to proceed with the outlined plan.      Patient desires ovarian conservation  Given her surgical history, left upper quadrant insertion will be indicated    Objective   Vital  "Signs:  /94 (BP Location: Left arm, Patient Position: Sitting, Cuff Size: Adult)   Ht 157.5 cm (62\")   Wt 76.7 kg (169 lb)   BMI 30.91 kg/m²   Estimated body mass index is 30.91 kg/m² as calculated from the following:    Height as of this encounter: 157.5 cm (62\").    Weight as of this encounter: 76.7 kg (169 lb).               Physical Exam   Result Review :                     Assessment and Plan     Diagnoses and all orders for this visit:    1. Menorrhagia with regular cycle (Primary)    2. Dysmenorrhea    3. Dyspareunia, female    4. Endometriosis    5. Non-smoker             Follow Up     No follow-ups on file.  Patient was given instructions and counseling regarding her condition or for health maintenance advice. Please see specific information pulled into the AVS if appropriate    Da Rianna assisted hysterectomy with bilateral salpingectomy with left upper quadrant approach    Jaspreet Apple MD          "

## 2024-04-24 NOTE — H&P
Jaspreet Apple MD  [facility]  [address]  [place] [address]  Office [phone]  Fax [phone]      [facility]  Mildred Li  1990  [id number]  [id number]  2024    Subjective   Mildred Li is a 33 y.o. year old female  who presents for surgery due to DUB, Chronic Pelvic Pain, and Menorrhagia.  Failed conservative measures include NSAIDS and OCPs.    COEMIG Procedure yes  Rating of Pain 8  Effect on daily activities significant    HPI    Risks, benefits, and alternatives of a hysterectomy were discussed with the patient in detail. Intraoperative risks of bleeding and damage to surrounding organs, including but not limited to intestine, bladder and ureter, were explained. Management of these were also explained. Postoperative complications such as infection, pneumonia, DVT, and bleeding were explained. Cuff dehiscence and cuff hematoma/cellulitis were also explained. The importance of compliance with postoperative restrictions was discussed. Long term effect regarding pelvic organ prolapse was also explained. Laparoscopic approach with use of the da Rianna robotic system was explained. Indications for conversion to a laparotomy were discussed.     Ovarian conservation/removal was also discussed. Menopausal symptoms associated with adnexectomy were explained in detail. Reduction in the risk of ovarian cancer was discussed. Unknown benefits of ovarian conservation were also discussed.     Removal of fallopian tubes regardless of ovarian removal was discussed and patient verbalized understanding.    All of the patient's questions were answered to her satisfaction. She was encouraged to return for an additional appointment if she had further questions. She verbalized understanding of the above and wished to proceed with the outlined plan.      Patient desires bilateral ovarian conservation    Past Medical History:   Diagnosis Date    Abnormal Pap  smear of cervix     ADD (attention deficit disorder)     Allergic     Seasonal    Anemia     When pregnant, along with the year following.    Anxiety     Breast cyst     Colon polyp     Depression     Endometriosis     Gastritis, chronic     GERD (gastroesophageal reflux disease)     Gastritis    Migraine     PONV (postoperative nausea and vomiting)        Past Surgical History:   Procedure Laterality Date    BREAST BIOPSY Right     COLONOSCOPY      Had 2-3 times in last decade.    ENDOMETRIAL ABLATION      Age of 22    HERNIA REPAIR      2 times    LAPAROSCOPIC CHOLECYSTECTOMY      TONSILLECTOMY      UMBILICAL HERNIA REPAIR      Age of 10 or 11    WISDOM TOOTH EXTRACTION           Current Outpatient Medications:     amphetamine-dextroamphetamine (Adderall) 15 MG tablet, Take 1 tablet by mouth 2 (Two) Times a Day for 30 days., Disp: 60 tablet, Rfl: 0    [START ON 5/13/2024] amphetamine-dextroamphetamine (Adderall) 15 MG tablet, Take 1 tablet by mouth 2 (Two) Times a Day for 30 days., Disp: 60 tablet, Rfl: 0    cyclobenzaprine (FLEXERIL) 10 MG tablet, Take 1 tablet by mouth 3 (Three) Times a Day As Needed for Muscle Spasms., Disp: 90 tablet, Rfl: 11    escitalopram (Lexapro) 10 MG tablet, Take 1 tablet by mouth Daily., Disp: 90 tablet, Rfl: 3    galcanezumab-gnlm (Emgality) 120 MG/ML auto-injector pen, Inject 1 mL under the skin into the appropriate area as directed Every 30 (Thirty) Days., Disp: 1.12 mL, Rfl: 11    metaxalone (Skelaxin) 800 MG tablet, Take 1 tablet by mouth 3 (Three) Times a Day As Needed for Muscle Spasms., Disp: 30 tablet, Rfl: 3    Rimegepant Sulfate (Nurtec) 75 MG tablet dispersible tablet, Take 1 tablet by mouth Every Other Day. (Patient taking differently: Take 1 tablet by mouth Every Other Day As Needed.), Disp: 15 tablet, Rfl: 11    SUMAtriptan (IMITREX) 100 MG tablet, Take 1 tablet by mouth Every 2 (Two) Hours As Needed for Migraine. Take one tablet at onset of headache. May repeat dose one  time in 2 hours if headache not relieved., Disp: 10 tablet, Rfl: 11    topiramate (TOPAMAX) 50 MG tablet, Take 1 tablet by mouth 2 (Two) Times a Day., Disp: 60 tablet, Rfl: 11    Allergies   Allergen Reactions    Buspar [Buspirone] Mental Status Change    Morphine And Related Itching and Other (See Comments)     Tried to scratch eyes out.    Norgestimate-Eth Estradiol Nausea And Vomiting       Family History   Problem Relation Age of Onset    Breast cancer Paternal Grandmother     Cancer Paternal Grandmother         Breast    Diabetes Paternal Grandmother     Early death Paternal Grandmother         32 years of age    Arthritis Maternal Grandfather     Cancer Maternal Grandfather         Skin and Pancreatic    Heart disease Maternal Grandfather     Hyperlipidemia Maternal Grandfather     Ovarian cancer Maternal Aunt     Uterine cancer Maternal Aunt     Colon cancer Cousin     Uterine cancer Cousin     Hyperlipidemia Mother     Miscarriages / Stillbirths Mother     Arthritis Maternal Grandmother     Hearing loss Maternal Grandmother     Hyperlipidemia Maternal Grandmother     Miscarriages / Stillbirths Maternal Grandmother     Heart disease Paternal Grandfather     Hyperlipidemia Paternal Grandfather     Depression Brother        Social History     Socioeconomic History    Marital status:    Tobacco Use    Smoking status: Never    Smokeless tobacco: Never   Vaping Use    Vaping status: Never Used   Substance and Sexual Activity    Alcohol use: Yes     Comment: Occasionally    Drug use: No    Sexual activity: Yes     Partners: Male     Birth control/protection: Condom       OB History    Para Term  AB Living   2 2 2 0 0 2   SAB IAB Ectopic Molar Multiple Live Births   0 0 0 0 0 2      # Outcome Date GA Lbr Tr/2nd Weight Sex Type Anes PTL Lv   2 Term 20 40w0d  3997 g (8 lb 13 oz) F Vag-Spont   JAKE   1 Term 09/15/17 38w0d  3090 g (6 lb 13 oz) M Vag-Spont   JAKE       Review of Systems    Genitourinary:  Positive for menstrual problem, pelvic pain and vaginal bleeding.   All other systems reviewed and are negative.         Objective   Vitals:    04/24/24 1000   BP: 146/94       Physical Exam  Constitutional:       General: She is not in acute distress.     Appearance: Normal appearance. She is not toxic-appearing.   HENT:      Head: Normocephalic and atraumatic.      Nose: Nose normal.   Neurological:      General: No focal deficit present.      Mental Status: She is alert.   Psychiatric:         Mood and Affect: Mood normal.         Behavior: Behavior normal.         Thought Content: Thought content normal.         Judgment: Judgment normal.            Assessment & Plan   Assessment/Plan:  Diagnoses and all orders for this visit:    1. Menorrhagia with regular cycle (Primary)    2. Dysmenorrhea    3. Dyspareunia, female    4. Endometriosis    5. Non-smoker        The risks, benefits, and alternatives of the procedure; along with the risks of anesthesia was discussed in full with the patient and all questions were answered    Da Rianna hysterectomy with bilateral salpingectomy with left upper quadrant insertion approach    Jaspreet Apple MD

## 2024-05-10 ENCOUNTER — HOSPITAL ENCOUNTER (OUTPATIENT)
Facility: HOSPITAL | Age: 34
Setting detail: HOSPITAL OUTPATIENT SURGERY
Discharge: HOME | End: 2024-05-10
Attending: OBSTETRICS & GYNECOLOGY | Admitting: OBSTETRICS & GYNECOLOGY
Payer: COMMERCIAL

## 2024-05-10 ENCOUNTER — ANESTHESIA EVENT (OUTPATIENT)
Dept: PERIOP | Facility: HOSPITAL | Age: 34
End: 2024-05-10
Payer: COMMERCIAL

## 2024-05-10 ENCOUNTER — ANESTHESIA (OUTPATIENT)
Dept: PERIOP | Facility: HOSPITAL | Age: 34
End: 2024-05-10
Payer: COMMERCIAL

## 2024-05-10 VITALS
DIASTOLIC BLOOD PRESSURE: 72 MMHG | HEART RATE: 81 BPM | RESPIRATION RATE: 14 BRPM | TEMPERATURE: 97 F | OXYGEN SATURATION: 98 % | SYSTOLIC BLOOD PRESSURE: 96 MMHG

## 2024-05-10 DIAGNOSIS — N92.0 MENORRHAGIA WITH REGULAR CYCLE: ICD-10-CM

## 2024-05-10 DIAGNOSIS — N94.6 DYSMENORRHEA: ICD-10-CM

## 2024-05-10 DIAGNOSIS — N80.9 ENDOMETRIOSIS: ICD-10-CM

## 2024-05-10 DIAGNOSIS — N94.10 DYSPAREUNIA, FEMALE: ICD-10-CM

## 2024-05-10 LAB
ABO GROUP BLD: NORMAL
B-HCG UR QL: NEGATIVE
BLD GP AB SCN SERPL QL: NEGATIVE
RH BLD: POSITIVE
T&S EXPIRATION DATE: NORMAL

## 2024-05-10 PROCEDURE — 86900 BLOOD TYPING SEROLOGIC ABO: CPT | Performed by: OBSTETRICS & GYNECOLOGY

## 2024-05-10 PROCEDURE — 86850 RBC ANTIBODY SCREEN: CPT | Performed by: OBSTETRICS & GYNECOLOGY

## 2024-05-10 PROCEDURE — 25010000002 HYDROMORPHONE PER 4 MG: Performed by: ANESTHESIOLOGY

## 2024-05-10 PROCEDURE — 88307 TISSUE EXAM BY PATHOLOGIST: CPT | Performed by: OBSTETRICS & GYNECOLOGY

## 2024-05-10 PROCEDURE — 25010000002 DEXAMETHASONE PER 1 MG: Performed by: NURSE ANESTHETIST, CERTIFIED REGISTERED

## 2024-05-10 PROCEDURE — 25010000002 DEXAMETHASONE PER 1 MG: Performed by: ANESTHESIOLOGY

## 2024-05-10 PROCEDURE — 25010000002 FENTANYL CITRATE (PF) 250 MCG/5ML SOLUTION: Performed by: NURSE ANESTHETIST, CERTIFIED REGISTERED

## 2024-05-10 PROCEDURE — 25010000002 DROPERIDOL PER 5 MG: Performed by: ANESTHESIOLOGY

## 2024-05-10 PROCEDURE — 25010000002 CEFAZOLIN PER 500 MG: Performed by: OBSTETRICS & GYNECOLOGY

## 2024-05-10 PROCEDURE — 25010000002 ONDANSETRON PER 1 MG: Performed by: NURSE ANESTHETIST, CERTIFIED REGISTERED

## 2024-05-10 PROCEDURE — 86901 BLOOD TYPING SEROLOGIC RH(D): CPT | Performed by: OBSTETRICS & GYNECOLOGY

## 2024-05-10 PROCEDURE — 25010000002 FUROSEMIDE PER 20 MG: Performed by: NURSE ANESTHETIST, CERTIFIED REGISTERED

## 2024-05-10 PROCEDURE — 25810000003 SODIUM CHLORIDE PER 500 ML: Performed by: OBSTETRICS & GYNECOLOGY

## 2024-05-10 PROCEDURE — 25010000002 SUGAMMADEX 200 MG/2ML SOLUTION: Performed by: NURSE ANESTHETIST, CERTIFIED REGISTERED

## 2024-05-10 PROCEDURE — 25010000002 PROPOFOL 10 MG/ML EMULSION: Performed by: NURSE ANESTHETIST, CERTIFIED REGISTERED

## 2024-05-10 PROCEDURE — 25010000002 MIDAZOLAM PER 1 MG: Performed by: ANESTHESIOLOGY

## 2024-05-10 PROCEDURE — 58571 TLH W/T/O 250 G OR LESS: CPT | Performed by: OBSTETRICS & GYNECOLOGY

## 2024-05-10 PROCEDURE — 25810000003 LACTATED RINGERS PER 1000 ML: Performed by: OBSTETRICS & GYNECOLOGY

## 2024-05-10 PROCEDURE — 25010000002 FENTANYL CITRATE (PF) 50 MCG/ML SOLUTION: Performed by: ANESTHESIOLOGY

## 2024-05-10 PROCEDURE — 81025 URINE PREGNANCY TEST: CPT | Performed by: OBSTETRICS & GYNECOLOGY

## 2024-05-10 DEVICE — DEV CONTRL TISS STRATAFIX SPIRAL PDS PLUS SZ0 CT/2 30CM VIL: Type: IMPLANTABLE DEVICE | Site: ABDOMEN | Status: FUNCTIONAL

## 2024-05-10 RX ORDER — DEXAMETHASONE SODIUM PHOSPHATE 4 MG/ML
INJECTION, SOLUTION INTRA-ARTICULAR; INTRALESIONAL; INTRAMUSCULAR; INTRAVENOUS; SOFT TISSUE AS NEEDED
Status: DISCONTINUED | OUTPATIENT
Start: 2024-05-10 | End: 2024-05-10 | Stop reason: SURG

## 2024-05-10 RX ORDER — FUROSEMIDE 10 MG/ML
INJECTION INTRAMUSCULAR; INTRAVENOUS AS NEEDED
Status: DISCONTINUED | OUTPATIENT
Start: 2024-05-10 | End: 2024-05-10 | Stop reason: SURG

## 2024-05-10 RX ORDER — PHENAZOPYRIDINE HYDROCHLORIDE 200 MG/1
200 TABLET, FILM COATED ORAL ONCE
Status: COMPLETED | OUTPATIENT
Start: 2024-05-10 | End: 2024-05-10

## 2024-05-10 RX ORDER — HYDROMORPHONE HYDROCHLORIDE 1 MG/ML
0.5 INJECTION, SOLUTION INTRAMUSCULAR; INTRAVENOUS; SUBCUTANEOUS
Status: DISCONTINUED | OUTPATIENT
Start: 2024-05-10 | End: 2024-05-10 | Stop reason: HOSPADM

## 2024-05-10 RX ORDER — FENTANYL CITRATE 50 UG/ML
25 INJECTION, SOLUTION INTRAMUSCULAR; INTRAVENOUS
Status: DISCONTINUED | OUTPATIENT
Start: 2024-05-10 | End: 2024-05-10 | Stop reason: HOSPADM

## 2024-05-10 RX ORDER — SODIUM CHLORIDE 9 MG/ML
40 INJECTION, SOLUTION INTRAVENOUS AS NEEDED
Status: DISCONTINUED | OUTPATIENT
Start: 2024-05-10 | End: 2024-05-10 | Stop reason: HOSPADM

## 2024-05-10 RX ORDER — DROPERIDOL 2.5 MG/ML
0.62 INJECTION, SOLUTION INTRAMUSCULAR; INTRAVENOUS ONCE AS NEEDED
Status: COMPLETED | OUTPATIENT
Start: 2024-05-10 | End: 2024-05-10

## 2024-05-10 RX ORDER — SODIUM CHLORIDE 0.9 % (FLUSH) 0.9 %
10 SYRINGE (ML) INJECTION AS NEEDED
Status: DISCONTINUED | OUTPATIENT
Start: 2024-05-10 | End: 2024-05-10 | Stop reason: HOSPADM

## 2024-05-10 RX ORDER — FENTANYL CITRATE 50 UG/ML
50 INJECTION, SOLUTION INTRAMUSCULAR; INTRAVENOUS
Status: DISCONTINUED | OUTPATIENT
Start: 2024-05-10 | End: 2024-05-10 | Stop reason: HOSPADM

## 2024-05-10 RX ORDER — ONDANSETRON 2 MG/ML
4 INJECTION INTRAMUSCULAR; INTRAVENOUS
Status: DISCONTINUED | OUTPATIENT
Start: 2024-05-10 | End: 2024-05-10 | Stop reason: HOSPADM

## 2024-05-10 RX ORDER — SODIUM CHLORIDE, SODIUM LACTATE, POTASSIUM CHLORIDE, CALCIUM CHLORIDE 600; 310; 30; 20 MG/100ML; MG/100ML; MG/100ML; MG/100ML
9 INJECTION, SOLUTION INTRAVENOUS CONTINUOUS
Status: DISCONTINUED | OUTPATIENT
Start: 2024-05-10 | End: 2024-05-10 | Stop reason: HOSPADM

## 2024-05-10 RX ORDER — PROMETHAZINE HYDROCHLORIDE 25 MG/1
12.5 TABLET ORAL ONCE AS NEEDED
Status: DISCONTINUED | OUTPATIENT
Start: 2024-05-10 | End: 2024-05-10 | Stop reason: HOSPADM

## 2024-05-10 RX ORDER — SCOLOPAMINE TRANSDERMAL SYSTEM 1 MG/1
1 PATCH, EXTENDED RELEASE TRANSDERMAL ONCE
Status: DISCONTINUED | OUTPATIENT
Start: 2024-05-10 | End: 2024-05-10 | Stop reason: HOSPADM

## 2024-05-10 RX ORDER — LABETALOL HYDROCHLORIDE 5 MG/ML
5 INJECTION, SOLUTION INTRAVENOUS
Status: DISCONTINUED | OUTPATIENT
Start: 2024-05-10 | End: 2024-05-10 | Stop reason: HOSPADM

## 2024-05-10 RX ORDER — SODIUM CHLORIDE 0.9 % (FLUSH) 0.9 %
10 SYRINGE (ML) INJECTION EVERY 12 HOURS SCHEDULED
Status: DISCONTINUED | OUTPATIENT
Start: 2024-05-10 | End: 2024-05-10 | Stop reason: HOSPADM

## 2024-05-10 RX ORDER — DEXAMETHASONE SODIUM PHOSPHATE 4 MG/ML
4 INJECTION, SOLUTION INTRA-ARTICULAR; INTRALESIONAL; INTRAMUSCULAR; INTRAVENOUS; SOFT TISSUE ONCE AS NEEDED
Status: COMPLETED | OUTPATIENT
Start: 2024-05-10 | End: 2024-05-10

## 2024-05-10 RX ORDER — LIDOCAINE HYDROCHLORIDE 20 MG/ML
INJECTION, SOLUTION EPIDURAL; INFILTRATION; INTRACAUDAL; PERINEURAL AS NEEDED
Status: DISCONTINUED | OUTPATIENT
Start: 2024-05-10 | End: 2024-05-10 | Stop reason: SURG

## 2024-05-10 RX ORDER — GABAPENTIN 300 MG/1
600 CAPSULE ORAL ONCE
Status: COMPLETED | OUTPATIENT
Start: 2024-05-10 | End: 2024-05-10

## 2024-05-10 RX ORDER — MAGNESIUM HYDROXIDE 1200 MG/15ML
LIQUID ORAL AS NEEDED
Status: DISCONTINUED | OUTPATIENT
Start: 2024-05-10 | End: 2024-05-10 | Stop reason: HOSPADM

## 2024-05-10 RX ORDER — HYDROCODONE BITARTRATE AND ACETAMINOPHEN 5; 325 MG/1; MG/1
1 TABLET ORAL EVERY 6 HOURS PRN
Qty: 6 TABLET | Refills: 0 | Status: SHIPPED | OUTPATIENT
Start: 2024-05-10

## 2024-05-10 RX ORDER — OXYCODONE AND ACETAMINOPHEN 10; 325 MG/1; MG/1
1 TABLET ORAL EVERY 4 HOURS PRN
Status: DISCONTINUED | OUTPATIENT
Start: 2024-05-10 | End: 2024-05-10 | Stop reason: HOSPADM

## 2024-05-10 RX ORDER — FLUMAZENIL 0.1 MG/ML
0.2 INJECTION INTRAVENOUS AS NEEDED
Status: DISCONTINUED | OUTPATIENT
Start: 2024-05-10 | End: 2024-05-10 | Stop reason: HOSPADM

## 2024-05-10 RX ORDER — ONDANSETRON 2 MG/ML
INJECTION INTRAMUSCULAR; INTRAVENOUS AS NEEDED
Status: DISCONTINUED | OUTPATIENT
Start: 2024-05-10 | End: 2024-05-10 | Stop reason: SURG

## 2024-05-10 RX ORDER — IBUPROFEN 600 MG/1
600 TABLET ORAL EVERY 6 HOURS PRN
Status: DISCONTINUED | OUTPATIENT
Start: 2024-05-10 | End: 2024-05-10 | Stop reason: HOSPADM

## 2024-05-10 RX ORDER — FENTANYL CITRATE 50 UG/ML
INJECTION, SOLUTION INTRAMUSCULAR; INTRAVENOUS AS NEEDED
Status: DISCONTINUED | OUTPATIENT
Start: 2024-05-10 | End: 2024-05-10 | Stop reason: SURG

## 2024-05-10 RX ORDER — NALOXONE HCL 0.4 MG/ML
0.04 VIAL (ML) INJECTION AS NEEDED
Status: DISCONTINUED | OUTPATIENT
Start: 2024-05-10 | End: 2024-05-10 | Stop reason: HOSPADM

## 2024-05-10 RX ORDER — HYDROCODONE BITARTRATE AND ACETAMINOPHEN 5; 325 MG/1; MG/1
1 TABLET ORAL ONCE AS NEEDED
Status: DISCONTINUED | OUTPATIENT
Start: 2024-05-10 | End: 2024-05-10 | Stop reason: HOSPADM

## 2024-05-10 RX ORDER — DEXTROSE MONOHYDRATE 25 G/50ML
12.5 INJECTION, SOLUTION INTRAVENOUS AS NEEDED
Status: DISCONTINUED | OUTPATIENT
Start: 2024-05-10 | End: 2024-05-10 | Stop reason: HOSPADM

## 2024-05-10 RX ORDER — IBUPROFEN 200 MG
600 TABLET ORAL EVERY 6 HOURS PRN
Status: DISCONTINUED | OUTPATIENT
Start: 2024-05-10 | End: 2024-05-10 | Stop reason: HOSPADM

## 2024-05-10 RX ORDER — ACETAMINOPHEN 500 MG
1000 TABLET ORAL ONCE
Status: COMPLETED | OUTPATIENT
Start: 2024-05-10 | End: 2024-05-10

## 2024-05-10 RX ORDER — SCOLOPAMINE TRANSDERMAL SYSTEM 1 MG/1
1 PATCH, EXTENDED RELEASE TRANSDERMAL CONTINUOUS
Status: DISCONTINUED | OUTPATIENT
Start: 2024-05-10 | End: 2024-05-10 | Stop reason: HOSPADM

## 2024-05-10 RX ORDER — MIDAZOLAM HYDROCHLORIDE 1 MG/ML
1 INJECTION INTRAMUSCULAR; INTRAVENOUS
Status: DISCONTINUED | OUTPATIENT
Start: 2024-05-10 | End: 2024-05-10 | Stop reason: HOSPADM

## 2024-05-10 RX ORDER — SODIUM CHLORIDE 9 MG/ML
INJECTION, SOLUTION INTRAVENOUS AS NEEDED
Status: DISCONTINUED | OUTPATIENT
Start: 2024-05-10 | End: 2024-05-10 | Stop reason: HOSPADM

## 2024-05-10 RX ORDER — PROPOFOL 10 MG/ML
VIAL (ML) INTRAVENOUS AS NEEDED
Status: DISCONTINUED | OUTPATIENT
Start: 2024-05-10 | End: 2024-05-10 | Stop reason: SURG

## 2024-05-10 RX ORDER — SODIUM CHLORIDE, SODIUM LACTATE, POTASSIUM CHLORIDE, CALCIUM CHLORIDE 600; 310; 30; 20 MG/100ML; MG/100ML; MG/100ML; MG/100ML
125 INJECTION, SOLUTION INTRAVENOUS CONTINUOUS
Status: DISCONTINUED | OUTPATIENT
Start: 2024-05-10 | End: 2024-05-10 | Stop reason: HOSPADM

## 2024-05-10 RX ORDER — ROCURONIUM BROMIDE 10 MG/ML
INJECTION, SOLUTION INTRAVENOUS AS NEEDED
Status: DISCONTINUED | OUTPATIENT
Start: 2024-05-10 | End: 2024-05-10 | Stop reason: SURG

## 2024-05-10 RX ORDER — SODIUM CHLORIDE, SODIUM LACTATE, POTASSIUM CHLORIDE, CALCIUM CHLORIDE 600; 310; 30; 20 MG/100ML; MG/100ML; MG/100ML; MG/100ML
1000 INJECTION, SOLUTION INTRAVENOUS CONTINUOUS
Status: DISCONTINUED | OUTPATIENT
Start: 2024-05-10 | End: 2024-05-10 | Stop reason: HOSPADM

## 2024-05-10 RX ADMIN — LIDOCAINE HYDROCHLORIDE 100 MG: 20 INJECTION, SOLUTION EPIDURAL; INFILTRATION; INTRACAUDAL; PERINEURAL at 07:05

## 2024-05-10 RX ADMIN — SUGAMMADEX 200 MG: 100 INJECTION, SOLUTION INTRAVENOUS at 08:18

## 2024-05-10 RX ADMIN — SODIUM CHLORIDE, POTASSIUM CHLORIDE, SODIUM LACTATE AND CALCIUM CHLORIDE 125 ML/HR: 600; 310; 30; 20 INJECTION, SOLUTION INTRAVENOUS at 06:26

## 2024-05-10 RX ADMIN — DEXAMETHASONE SODIUM PHOSPHATE 4 MG: 4 INJECTION, SOLUTION INTRAMUSCULAR; INTRAVENOUS at 08:09

## 2024-05-10 RX ADMIN — ACETAMINOPHEN 1000 MG: 500 TABLET, FILM COATED ORAL at 05:57

## 2024-05-10 RX ADMIN — SCOPALAMINE 1 PATCH: 1 PATCH, EXTENDED RELEASE TRANSDERMAL at 06:50

## 2024-05-10 RX ADMIN — FENTANYL CITRATE 100 MCG: 0.05 INJECTION, SOLUTION INTRAMUSCULAR; INTRAVENOUS at 07:28

## 2024-05-10 RX ADMIN — PHENAZOPYRIDINE HYDROCHLORIDE 200 MG: 200 TABLET ORAL at 05:57

## 2024-05-10 RX ADMIN — MIDAZOLAM HYDROCHLORIDE 1 MG: 1 INJECTION, SOLUTION INTRAMUSCULAR; INTRAVENOUS at 06:50

## 2024-05-10 RX ADMIN — GABAPENTIN 600 MG: 300 CAPSULE ORAL at 05:57

## 2024-05-10 RX ADMIN — SODIUM CHLORIDE, POTASSIUM CHLORIDE, SODIUM LACTATE AND CALCIUM CHLORIDE 1000 ML: 600; 310; 30; 20 INJECTION, SOLUTION INTRAVENOUS at 06:26

## 2024-05-10 RX ADMIN — PROPOFOL 150 MG: 10 INJECTION, EMULSION INTRAVENOUS at 07:05

## 2024-05-10 RX ADMIN — HYDROMORPHONE HYDROCHLORIDE 0.5 MG: 1 INJECTION, SOLUTION INTRAMUSCULAR; INTRAVENOUS; SUBCUTANEOUS at 09:13

## 2024-05-10 RX ADMIN — CEFAZOLIN 2000 MG: 2 INJECTION, POWDER, FOR SOLUTION INTRAMUSCULAR; INTRAVENOUS at 07:08

## 2024-05-10 RX ADMIN — HYDROMORPHONE HYDROCHLORIDE 0.5 MG: 1 INJECTION, SOLUTION INTRAMUSCULAR; INTRAVENOUS; SUBCUTANEOUS at 09:03

## 2024-05-10 RX ADMIN — FENTANYL CITRATE 150 MCG: 0.05 INJECTION, SOLUTION INTRAMUSCULAR; INTRAVENOUS at 07:05

## 2024-05-10 RX ADMIN — FENTANYL CITRATE 50 MCG: 50 INJECTION, SOLUTION INTRAMUSCULAR; INTRAVENOUS at 09:16

## 2024-05-10 RX ADMIN — DROPERIDOL 0.62 MG: 2.5 INJECTION, SOLUTION INTRAMUSCULAR; INTRAVENOUS at 09:55

## 2024-05-10 RX ADMIN — FUROSEMIDE 20 MG: 10 INJECTION, SOLUTION INTRAMUSCULAR; INTRAVENOUS at 08:03

## 2024-05-10 RX ADMIN — ROCURONIUM 50 MG: 50 INJECTION, SOLUTION INTRAVENOUS at 07:05

## 2024-05-10 RX ADMIN — DEXAMETHASONE SODIUM PHOSPHATE 4 MG: 4 INJECTION INTRA-ARTICULAR; INTRALESIONAL; INTRAMUSCULAR; INTRAVENOUS; SOFT TISSUE at 06:50

## 2024-05-10 RX ADMIN — ONDANSETRON 4 MG: 2 INJECTION INTRAMUSCULAR; INTRAVENOUS at 08:17

## 2024-05-10 NOTE — OP NOTE
Jaspreet Apple MD  [facility]  [address]  [place] [id number]  Office [phone]  Fax [phone]      [facility]    Mildred Li  [id number]  [id number]  5/10/2024        Da Rianna Procedure Note      Preoperative Diagnosis: Menorrhagia and Pelvic pain    Postoperative Diagnosis: Menorrhagia and Pelvic pain    Operation: Total Laparoscopic Hysterectomy with bilateral salpingectomy, da Rianna assisted and Cystoscopy    Surgeon: APOLINAR Apple MD, FACOG    Assistants:      was responsible for performing the following activities: Retraction, Suction, and Irrigation and their skilled assistance was necessary for the success of this case.    Anesthesia: General endotracheal anesthesia    Findings: Normal pelvis    Estimated Blood Loss: Minimal      Specimens: Uterus, Cervix, and Bilateral fallopian tubes    Complications:  None    Disposition: PACU - hemodynamically stable.      Procedure Details    The patient was seen in the Holding Room. The risks, benefits, complications, treatment options, and expected outcomes were discussed with the patient. The patient concurred with the proposed plan, giving informed consent. The patient was identified as Mildred Li and the procedure verified as the procedure described above.   A timeout was held and the above information confirmed.    After these above consents were obtained the patient was taken to the operating room, where general anesthesia was administered. She was placed in the dorsal lithotomy position with care taken in placement of her legs to avoid nerve injury. She was prepped and draped in the usual sterile fashion. Mathew catheter was inserted. A complete procedural timeout was completed to ensure proper patient and proper procedure.     Given patient's previous and multiple umbilical hernia repairs, left upper quadrant insertion was chosen.  Along the midclavicular line just below the costal margin, incision was  made.  Orogastric tube placement was confirmed with anesthesia.  Veress needle was inserted without difficulty. Opening pressure was 3 mmHg and the abdomen insufflated to 15 mmHg. A number 5 bladeless trocar was inserted without difficulty and proper placement was confirmed with the laparoscope.     There were some omental adhesions noted but they were well above the umbilicus.  Therefore, a supraumbilical skin incision was made and an 8 mm da Rianna trocar was inserted under direct visualization.    Areas that were immediately adjacent to entry were free of injury. Two additional 8 mm da Rianna trocars were placed under direct visualization.  The left upper quadrant incision was then extended and an 8 mm da Rinana trocar was placed into this area to serve as the patient's side assist.  This was done under direct visualization.    The patient was then placed in Trendelenburg position and a PÃºbliKo uterine manipulator, colpotomizer and vaginal occluder were inserted under direct visualization. The robot was docked using a side-docking technique. Monopolar scissors were placed in the right-sided arm and bipolar fenestrated instrument into the left-sided arm.  Examination of the pelvis showed the above findings. The ureters were identified bilaterally. The round ligaments were sealed and transected and retroperitoneal spaces were explored bilaterally. Anteriorly, we created a bladder flap. The bladder was minimally adherent to the lower uterine segment and care was taken to properly gain access to the plane to adequately dissect the bladder off of the lower uterine segment and cervix.     Attention was then turned to the adnexa.     Because the patient desired ovarian conservation, but understood the indications for bilateral salpingectomy, I then dissected through the mesosalpinx in order to separate the fallopian tube from the ovary.  The utero-ovarian ligament was then sealed and transected.  This was performed  bilaterally.      Dissection was then performed through the posterior leaf of the broad ligament down to the level of the uterosacral ligaments bilaterally. The ureters were once again identified.  The uterine arteries were skeletonized bilaterally, sealed and transected.  Colpotomy was begun and carried around circumferentially until complete. The specimen was removed vaginally. All pedicles were noted to be hemostatic. Vaginal cuff was closed with barbed suture starting at the right angle and carried over to the left. The endopelvic fascia and anterior vaginal mucosa were approximated to the posterior vaginal mucosa, peritoneum and uterosacral ligaments when appropriate. Once the left angle was reached, the suture was secured and double backed towards the right angle to create a double layer closure and the suture was cut flush with the tissue. The pelvis was copiously irrigated and suctioned and again hemostasis was noted even after desufflation.      The vaginal cuff was then inspected vaginally. Good approximation of the tissue was noted and no bleeding was encountered. The 70 degree cystoscope was used to inspect the bladder and all walls. All walls were normal without abnormality or evidence of injury. Bilateral ureteral patency was noted. At this point, the instruments were removed.     The robot was undocked, insufflation was released and the trocars were removed. Skin incisions were closed subcuticularly.  The patient tolerated the procedure well. All instrument counts were correct. She was taken to the recovery room after extubation in stable condition.     Jaspreet Apple MD  05/10/24  08:29 CDT

## 2024-05-14 LAB
CYTO UR: NORMAL
LAB AP CASE REPORT: NORMAL
Lab: NORMAL
PATH REPORT.FINAL DX SPEC: NORMAL
PATH REPORT.GROSS SPEC: NORMAL

## 2024-05-22 ENCOUNTER — TELEMEDICINE (OUTPATIENT)
Age: 34
End: 2024-05-22
Payer: COMMERCIAL

## 2024-05-22 DIAGNOSIS — Z09 POSTOP CHECK: Primary | ICD-10-CM

## 2024-05-22 DIAGNOSIS — Z78.9 NON-SMOKER: ICD-10-CM

## 2024-05-22 NOTE — PROGRESS NOTES
POSTOPERATIVE VIDEO VISIT      HPI  Mildred Li presents for postoperative video visit. The visit from a scheduled appointment was initiated with an audio and visual connection by the patient. The advantages and limitations of video visits were discussed and understood by the patient. She is s/p TOTAL LAPAROSCOPIC HYSTERECTOMY WITH DAVINCI ROBOT WITH BILATERAL SALPINGECTOMY (TUBES ONLY) . The patient has had a relatively normal postoperative course.  The patient has had no current complaints. The patient has had improving normal postoperative pain.  The patient has had no issues with the wound.     Op Note by Jaspreet Apple MD (05/10/2024 07:24)   Tissue Pathology Exam (05/10/2024 07:33)     Review of Systems   All other systems reviewed and are negative.       Past History:  The following portions of the patient's history were reviewed and updated as appropriate: allergies, current medications, past family history, past medical history, past social history, past surgical history, and problem list.      Physical Exam  Constitutional:       General: She is not in acute distress.     Appearance: Normal appearance. She is not toxic-appearing.   HENT:      Head: Normocephalic and atraumatic.      Nose: Nose normal.   Neurological:      General: No focal deficit present.      Mental Status: She is alert.   Psychiatric:         Mood and Affect: Mood normal.         Behavior: Behavior normal.         Thought Content: Thought content normal.         Judgment: Judgment normal.          Assessment & Plan   Assessment:    1. Postop check    2. Non-smoker        Plan:   Continue postoperative care as instructed  Continue pelvic rest.        Return in about 6 weeks (around 7/3/2024) for with me.     Jaspreet Apple MD  5/22/2024

## 2024-06-17 ENCOUNTER — OFFICE VISIT (OUTPATIENT)
Dept: INTERNAL MEDICINE | Facility: CLINIC | Age: 34
End: 2024-06-17
Payer: COMMERCIAL

## 2024-06-17 VITALS
DIASTOLIC BLOOD PRESSURE: 83 MMHG | SYSTOLIC BLOOD PRESSURE: 124 MMHG | TEMPERATURE: 96.9 F | HEART RATE: 109 BPM | WEIGHT: 179 LBS | HEIGHT: 62 IN | OXYGEN SATURATION: 99 % | BODY MASS INDEX: 32.94 KG/M2

## 2024-06-17 DIAGNOSIS — F90.0 ATTENTION DEFICIT HYPERACTIVITY DISORDER, PREDOMINANTLY INATTENTIVE TYPE: Primary | ICD-10-CM

## 2024-06-17 PROCEDURE — 99213 OFFICE O/P EST LOW 20 MIN: CPT | Performed by: FAMILY MEDICINE

## 2024-06-17 RX ORDER — DEXTROAMPHETAMINE SACCHARATE, AMPHETAMINE ASPARTATE, DEXTROAMPHETAMINE SULFATE AND AMPHETAMINE SULFATE 3.75; 3.75; 3.75; 3.75 MG/1; MG/1; MG/1; MG/1
15 TABLET ORAL 2 TIMES DAILY
Qty: 60 TABLET | Refills: 0 | Status: SHIPPED | OUTPATIENT
Start: 2024-07-15 | End: 2024-06-19 | Stop reason: SDUPTHER

## 2024-06-17 RX ORDER — DEXTROAMPHETAMINE SACCHARATE, AMPHETAMINE ASPARTATE, DEXTROAMPHETAMINE SULFATE AND AMPHETAMINE SULFATE 3.75; 3.75; 3.75; 3.75 MG/1; MG/1; MG/1; MG/1
15 TABLET ORAL 2 TIMES DAILY
Qty: 60 TABLET | Refills: 0 | Status: SHIPPED | OUTPATIENT
Start: 2024-06-17 | End: 2024-06-19 | Stop reason: SDUPTHER

## 2024-06-17 RX ORDER — DEXTROAMPHETAMINE SACCHARATE, AMPHETAMINE ASPARTATE, DEXTROAMPHETAMINE SULFATE AND AMPHETAMINE SULFATE 3.75; 3.75; 3.75; 3.75 MG/1; MG/1; MG/1; MG/1
15 TABLET ORAL 2 TIMES DAILY
Qty: 60 TABLET | Refills: 0 | Status: SHIPPED | OUTPATIENT
Start: 2024-08-12 | End: 2024-06-19 | Stop reason: SDUPTHER

## 2024-06-17 NOTE — PROGRESS NOTES
Subjective     Chief Complaint   Patient presents with    ADD       History of Present Illness    Mildred Li is a 34 y.o. female who presents for a recheck of ADHD.  she reports compliance with medication regimen.  she reports No side effects. she also reports symptoms have improved since start of medication.  No evidence of diversion or abuse has been noted with this patient.  Patient reports that the medication allows her to continue to be focused and maintain concentration at work.  she is happy with the current dose and does not feel that she needs to do any changes at this time.    Current symptoms include:   Inattention: None were reported  Hyperactivity: None were reported  Impulsivity: None were reported  Mental Health: No symptoms of depression, anxiety, bipolar disease or psychosis or conduct disorders. were reported      Patient's PMR from outside medical facility reviewed and noted.      Past Medical History:   Past Medical History:   Diagnosis Date    Abnormal Pap smear of cervix     ADD (attention deficit disorder)     Allergic     Seasonal    Anemia     When pregnant, along with the year following.    Anxiety     Breast cyst     Colon polyp     Depression     Endometriosis     Gastritis, chronic     GERD (gastroesophageal reflux disease)     Gastritis    Migraine     PONV (postoperative nausea and vomiting)      Past Surgical History:  Past Surgical History:   Procedure Laterality Date    BREAST BIOPSY Right     COLONOSCOPY      Had 2-3 times in last decade.    ENDOMETRIAL ABLATION      Age of 22    HERNIA REPAIR      2 times    LAPAROSCOPIC CHOLECYSTECTOMY      TONSILLECTOMY      TOTAL LAPAROSCOPIC HYSTERECTOMY N/A 5/10/2024    Procedure: TOTAL LAPAROSCOPIC HYSTERECTOMY WITH DAVINCI ROBOT WITH BILATERAL SALPINGECTOMY (TUBES ONLY);  Surgeon: Jaspreet Apple MD;  Location: [facility];  Service: Robotics - DaVinci;  Laterality: N/A;    UMBILICAL HERNIA REPAIR      Age of 10 or 11     WISDOM TOOTH EXTRACTION       Social History:  reports that she has never smoked. She has never used smokeless tobacco. She reports current alcohol use. She reports that she does not use drugs.    Family History: family history includes Arthritis in her maternal grandfather and maternal grandmother; Breast cancer in her paternal grandmother; Cancer in her maternal grandfather and paternal grandmother; Colon cancer in her cousin; Depression in her brother; Diabetes in her paternal grandmother; Early death in her paternal grandmother; Hearing loss in her maternal grandmother; Heart disease in her maternal grandfather and paternal grandfather; Hyperlipidemia in her maternal grandfather, maternal grandmother, mother, and paternal grandfather; Miscarriages / Stillbirths in her maternal grandmother and mother; Ovarian cancer in her maternal aunt; Uterine cancer in her cousin and maternal aunt.      Allergies:  Allergies   Allergen Reactions    Buspar [Buspirone] Mental Status Change    Morphine And Codeine Itching and Other (See Comments)     Tried to scratch eyes out.    Norgestimate-Eth Estradiol Nausea And Vomiting     Medications:  Prior to Admission medications    Medication Sig Start Date End Date Taking? Authorizing Provider   cyclobenzaprine (FLEXERIL) 10 MG tablet Take 1 tablet by mouth 3 (Three) Times a Day As Needed for Muscle Spasms. 9/25/23  Yes Lalito Kaur MD   escitalopram (Lexapro) 10 MG tablet Take 1 tablet by mouth Daily. 9/25/23  Yes Lalito Kaur MD   galcanezumab-gnlm (Emgality) 120 MG/ML auto-injector pen Inject 1 mL under the skin into the appropriate area as directed Every 30 (Thirty) Days. 9/25/23  Yes Lalito Kaur MD   metaxalone (Skelaxin) 800 MG tablet Take 1 tablet by mouth 3 (Three) Times a Day As Needed for Muscle Spasms. 9/26/23  Yes Jaspreet Apple MD   Relugolix-Estradiol-Norethind (Myfembree) 40-1-0.5 MG tablet Take 1 tablet by mouth Daily.  "2/21/24  Yes Jaspreet Apple MD   Rimegepant Sulfate (Nurtec) 75 MG tablet dispersible tablet Take 1 tablet by mouth Every Other Day. 9/25/23  Yes Lalito Kaur MD   SUMAtriptan (IMITREX) 100 MG tablet Take 1 tablet by mouth Every 2 (Two) Hours As Needed for Migraine. Take one tablet at onset of headache. May repeat dose one time in 2 hours if headache not relieved. 9/25/23  Yes Lalito Kaur MD   topiramate (TOPAMAX) 50 MG tablet Take 1 tablet by mouth 2 (Two) Times a Day. 12/26/23  Yes Lalito Kaur MD           Review of systems   negative unless otherwise specified above in HPI    Objective     Vital Signs: /83 (BP Location: Left arm, Patient Position: Sitting, Cuff Size: Adult)   Pulse 109   Temp 96.9 °F (36.1 °C) (Infrared)   Ht 157.5 cm (62\")   Wt 81.2 kg (179 lb)   SpO2 99%   BMI 32.74 kg/m²     Physical Exam  Vitals and nursing note reviewed.   Constitutional:       General: She is not in acute distress.     Appearance: Normal appearance.   HENT:      Head: Normocephalic.   Eyes:      Extraocular Movements: Extraocular movements intact.      Pupils: Pupils are equal, round, and reactive to light.   Cardiovascular:      Rate and Rhythm: Normal rate and regular rhythm.      Heart sounds: Normal heart sounds. No murmur heard.  Pulmonary:      Effort: Pulmonary effort is normal. No respiratory distress.      Breath sounds: Normal breath sounds. No rhonchi or rales.   Abdominal:      General: Abdomen is flat. Bowel sounds are normal.      Palpations: Abdomen is soft.   Neurological:      General: No focal deficit present.      Mental Status: She is alert.       BMI is >= 25 and <30. (Overweight) The following options were offered after discussion;: exercise counseling/recommendations and nutrition counseling/recommendations             Results Reviewed:  Glucose   Date Value Ref Range Status   11/01/2022 84 70 - 99 mg/dL Final     BUN   Date Value Ref Range " Status   11/01/2022 9 6 - 20 mg/dL Final     Creatinine   Date Value Ref Range Status   11/01/2022 0.79 0.57 - 1.00 mg/dL Final     Sodium   Date Value Ref Range Status   11/01/2022 140 134 - 144 mmol/L Final     Potassium   Date Value Ref Range Status   11/01/2022 3.8 3.5 - 5.2 mmol/L Final     Chloride   Date Value Ref Range Status   11/01/2022 104 96 - 106 mmol/L Final     Total CO2   Date Value Ref Range Status   11/01/2022 24 20 - 29 mmol/L Final     Calcium   Date Value Ref Range Status   11/01/2022 9.2 8.7 - 10.2 mg/dL Final     ALT (SGPT)   Date Value Ref Range Status   11/01/2022 14 0 - 32 IU/L Final     AST (SGOT)   Date Value Ref Range Status   11/01/2022 13 0 - 40 IU/L Final     WBC   Date Value Ref Range Status   04/24/2024 7.03 3.40 - 10.80 10*3/mm3 Final   02/22/2023 8.2 3.4 - 10.8 x10E3/uL Final     Hematocrit   Date Value Ref Range Status   04/24/2024 41.0 34.0 - 46.6 % Final     Platelets   Date Value Ref Range Status   04/24/2024 283 140 - 450 10*3/mm3 Final                 Assessment / Plan     Assessment/Plan:   Diagnosis Plan   1. Attention deficit hyperactivity disorder, predominantly inattentive type  amphetamine-dextroamphetamine (Adderall) 15 MG tablet    amphetamine-dextroamphetamine (Adderall) 15 MG tablet    amphetamine-dextroamphetamine (Adderall) 15 MG tablet              No follow-ups on file. unless patient needs to be seen sooner or acute issues arise.      I have discussed the patient results/orders and and plan/recommendation with them at today's visit.      Signed by:    Lalito Kaur MD Date: 06/17/24

## 2024-06-19 DIAGNOSIS — F90.0 ATTENTION DEFICIT HYPERACTIVITY DISORDER, PREDOMINANTLY INATTENTIVE TYPE: ICD-10-CM

## 2024-06-19 RX ORDER — DEXTROAMPHETAMINE SACCHARATE, AMPHETAMINE ASPARTATE, DEXTROAMPHETAMINE SULFATE AND AMPHETAMINE SULFATE 3.75; 3.75; 3.75; 3.75 MG/1; MG/1; MG/1; MG/1
15 TABLET ORAL 2 TIMES DAILY
Qty: 60 TABLET | Refills: 0 | Status: SHIPPED | OUTPATIENT
Start: 2024-07-15 | End: 2024-08-14

## 2024-06-19 RX ORDER — DEXTROAMPHETAMINE SACCHARATE, AMPHETAMINE ASPARTATE, DEXTROAMPHETAMINE SULFATE AND AMPHETAMINE SULFATE 3.75; 3.75; 3.75; 3.75 MG/1; MG/1; MG/1; MG/1
15 TABLET ORAL 2 TIMES DAILY
Qty: 60 TABLET | Refills: 0 | Status: SHIPPED | OUTPATIENT
Start: 2024-06-19 | End: 2024-07-19

## 2024-06-19 RX ORDER — DEXTROAMPHETAMINE SACCHARATE, AMPHETAMINE ASPARTATE, DEXTROAMPHETAMINE SULFATE AND AMPHETAMINE SULFATE 3.75; 3.75; 3.75; 3.75 MG/1; MG/1; MG/1; MG/1
15 TABLET ORAL 2 TIMES DAILY
Qty: 60 TABLET | Refills: 0 | Status: SHIPPED | OUTPATIENT
Start: 2024-08-12 | End: 2024-09-11

## 2024-06-19 NOTE — TELEPHONE ENCOUNTER
You sent pt's rx's all to [facility] pharmacy,  but they need to go to  [facility]

## 2024-07-03 ENCOUNTER — OFFICE VISIT (OUTPATIENT)
Age: 34
End: 2024-07-03
Payer: COMMERCIAL

## 2024-07-03 VITALS
WEIGHT: 170 LBS | DIASTOLIC BLOOD PRESSURE: 74 MMHG | BODY MASS INDEX: 28.32 KG/M2 | SYSTOLIC BLOOD PRESSURE: 108 MMHG | HEIGHT: 65 IN

## 2024-07-03 DIAGNOSIS — Z78.9 NON-SMOKER: ICD-10-CM

## 2024-07-03 DIAGNOSIS — Z09 POSTOP CHECK: Primary | ICD-10-CM

## 2024-07-03 NOTE — PROGRESS NOTES
"Chief Complaint  Post-op (Pt here for 6wk post-op appt, had TLH/BS 05/22/2024 and is concerned about incisions, has not had intercourse)    Subjective        Mildred Li presents to [facility]  History of Present Illness    Patient presents today for postop follow-up  She is now 8 weeks status post da Rianna hysterectomy  She has been compliant with postoperative restrictions    Op Note by Jaspreet Apple MD (05/10/2024 07:24)   Tissue Pathology Exam (05/10/2024 07:33)     Objective   Vital Signs:  /74 (BP Location: Right arm, Patient Position: Sitting, Cuff Size: Adult)   Ht 165.1 cm (65\")   Wt 77.1 kg (170 lb)   BMI 28.29 kg/m²   Estimated body mass index is 28.29 kg/m² as calculated from the following:    Height as of this encounter: 165.1 cm (65\").    Weight as of this encounter: 77.1 kg (170 lb).               Physical Exam  Vitals and nursing note reviewed.   Constitutional:       Appearance: Normal appearance. She is well-developed.   Cardiovascular:      Rate and Rhythm: Normal rate and regular rhythm.   Pulmonary:      Effort: Pulmonary effort is normal.      Breath sounds: Normal breath sounds.   Abdominal:      General: Bowel sounds are normal. There is no distension.      Palpations: Abdomen is soft.      Tenderness: There is no abdominal tenderness.      Hernia: No hernia is present.      Comments: Laparoscopic incisions have healed well without evidence of infection or hernia.   Genitourinary:     Exam position: Supine.      Labia:         Right: No tenderness or lesion.         Left: No tenderness or lesion.       Vagina: No tenderness or bleeding.      Comments: Vaginal cuff in tact and healing well.  No evidence of infection or dehiscence.  Skin:     General: Skin is warm and dry.   Neurological:      General: No focal deficit present.      Mental Status: She is alert and oriented to person, place, and time. Mental status is at baseline.   Psychiatric:        "  Mood and Affect: Mood normal.         Behavior: Behavior normal.         Thought Content: Thought content normal.         Judgment: Judgment normal.        Result Review :                     Assessment and Plan     Diagnoses and all orders for this visit:    1. Postop check (Primary)    2. Non-smoker             Follow Up     Return in about 3 months (around 10/3/2024) for Telehealth, with me.  Patient was given instructions and counseling regarding her condition or for health maintenance advice. Please see specific information pulled into the AVS if appropriate.     Resume normal activities  Call for concerns or questions    Jaspreet Apple MD

## 2024-08-29 ENCOUNTER — PRIOR AUTHORIZATION (OUTPATIENT)
Dept: INTERNAL MEDICINE | Facility: CLINIC | Age: 34
End: 2024-08-29
Payer: COMMERCIAL

## 2024-08-29 NOTE — TELEPHONE ENCOUNTER
BOBBY SAMAYOA (Key: [id number])  PA Case ID #: [id number]  Need Help? Call us at [phone]  Outcome  Approved today by [employer] 2017  PA Case: [id number], Status: Approved, Coverage Starts on: 8/29/2024 12:00:00 AM, Coverage Ends on: 8/29/2025 12:00:00 AM.  Authorization Expiration Date: 8/28/2025  Drug  Nurtec 75MG dispersible tablets  ePA cloud logo  Form  [place] [employer] Form (2017 [employer])

## 2024-09-17 ENCOUNTER — TELEMEDICINE (OUTPATIENT)
Dept: INTERNAL MEDICINE | Facility: CLINIC | Age: 34
End: 2024-09-17
Payer: COMMERCIAL

## 2024-09-17 DIAGNOSIS — F90.0 ATTENTION DEFICIT HYPERACTIVITY DISORDER, PREDOMINANTLY INATTENTIVE TYPE: Primary | ICD-10-CM

## 2024-09-17 DIAGNOSIS — M62.89 PELVIC FLOOR DYSFUNCTION IN FEMALE: ICD-10-CM

## 2024-09-17 PROCEDURE — 99213 OFFICE O/P EST LOW 20 MIN: CPT | Performed by: FAMILY MEDICINE

## 2024-09-17 RX ORDER — DEXTROAMPHETAMINE SACCHARATE, AMPHETAMINE ASPARTATE MONOHYDRATE, DEXTROAMPHETAMINE SULFATE AND AMPHETAMINE SULFATE 7.5; 7.5; 7.5; 7.5 MG/1; MG/1; MG/1; MG/1
30 CAPSULE, EXTENDED RELEASE ORAL EVERY MORNING
Qty: 30 CAPSULE | Refills: 0 | Status: SHIPPED | OUTPATIENT
Start: 2024-09-17

## 2024-09-17 RX ORDER — METAXALONE 800 MG/1
800 TABLET ORAL 3 TIMES DAILY PRN
Qty: 30 TABLET | Refills: 3 | Status: SHIPPED | OUTPATIENT
Start: 2024-09-17

## 2024-09-18 ENCOUNTER — TELEPHONE (OUTPATIENT)
Dept: INTERNAL MEDICINE | Facility: CLINIC | Age: 34
End: 2024-09-18
Payer: COMMERCIAL

## 2024-09-18 DIAGNOSIS — M62.89 PELVIC FLOOR DYSFUNCTION IN FEMALE: ICD-10-CM

## 2024-09-18 DIAGNOSIS — R25.2 SPASM: Primary | ICD-10-CM

## 2024-10-08 DIAGNOSIS — F90.0 ATTENTION DEFICIT HYPERACTIVITY DISORDER, PREDOMINANTLY INATTENTIVE TYPE: ICD-10-CM

## 2024-10-08 RX ORDER — DEXTROAMPHETAMINE SACCHARATE, AMPHETAMINE ASPARTATE MONOHYDRATE, DEXTROAMPHETAMINE SULFATE AND AMPHETAMINE SULFATE 7.5; 7.5; 7.5; 7.5 MG/1; MG/1; MG/1; MG/1
30 CAPSULE, EXTENDED RELEASE ORAL EVERY MORNING
Qty: 30 CAPSULE | Refills: 0 | Status: SHIPPED | OUTPATIENT
Start: 2024-10-08

## 2024-10-31 ENCOUNTER — DOCUMENTATION (OUTPATIENT)
Dept: INTERNAL MEDICINE | Facility: CLINIC | Age: 34
End: 2024-10-31
Payer: COMMERCIAL

## 2025-06-23 ENCOUNTER — OFFICE VISIT (OUTPATIENT)
Dept: INTERNAL MEDICINE | Facility: CLINIC | Age: 35
End: 2025-06-23
Payer: COMMERCIAL

## 2025-06-23 VITALS
SYSTOLIC BLOOD PRESSURE: 105 MMHG | HEART RATE: 80 BPM | BODY MASS INDEX: 31.71 KG/M2 | OXYGEN SATURATION: 99 % | WEIGHT: 179 LBS | HEIGHT: 63 IN | DIASTOLIC BLOOD PRESSURE: 73 MMHG | TEMPERATURE: 98 F

## 2025-06-23 DIAGNOSIS — G47.00 INSOMNIA, UNSPECIFIED TYPE: ICD-10-CM

## 2025-06-23 DIAGNOSIS — R10.2 PELVIC PAIN: ICD-10-CM

## 2025-06-23 DIAGNOSIS — N64.3 GALACTORRHEA: ICD-10-CM

## 2025-06-23 DIAGNOSIS — Z00.00 ANNUAL PHYSICAL EXAM: Primary | ICD-10-CM

## 2025-06-23 PROCEDURE — 99395 PREV VISIT EST AGE 18-39: CPT

## 2025-06-23 RX ORDER — TRAZODONE HYDROCHLORIDE 50 MG/1
50 TABLET ORAL NIGHTLY
Qty: 30 TABLET | Refills: 2 | Status: SHIPPED | OUTPATIENT
Start: 2025-06-23

## 2025-06-23 NOTE — PROGRESS NOTES
Chief Complaint  Annual Exam    Subjective        Mildred Li presents to [facility]  History of Present Illness  Patient is a 35-year-old female presenting today for annual exam.   She has complaints today of insomnia. Has changed jobs and is now working 3rd shift and is struggling to sleep more than a few hours a day. Has trouble falling asleep and staying asleep but mostly staying asleep. She has noticed this for about the past 4 months.     Further has complaints today of ongoing pelvic pain. She has tried with minimal relief. Describes pain being aggravated with sitting, lying, walking. Reports difficulty some days walking into the store from the parking lot. Has tried taking a muscle relaxer with minimal relief.       Past Medical History:   Diagnosis Date    Abnormal Pap smear of cervix     ADD (attention deficit disorder)     Allergic     Seasonal    Anemia     When pregnant, along with the year following.    Anxiety     Breast cyst     Colon polyp     Depression     Endometriosis     Fibroid     Gastritis, chronic     GERD (gastroesophageal reflux disease)     Gastritis    Hyperemesis gravidarum     During pregnancy    Migraine     Multiple gestation     Ovarian cyst     Had surgery in at age 22 to remove along with endometriosis.    PMS (premenstrual syndrome)     PONV (postoperative nausea and vomiting)     Spinal headache     Ulcerative colitis     Urinary tract infection     Varicella     Had Chicken Pox 3 times. Between ages 1 to 10 years of age.     Past Surgical History:   Procedure Laterality Date    BREAST BIOPSY Right     COLONOSCOPY      Had 2-3 times in last decade.    ENDOMETRIAL ABLATION      Age of 22    HERNIA REPAIR      2 times    LAPAROSCOPIC CHOLECYSTECTOMY      TONSILLECTOMY      TOTAL LAPAROSCOPIC HYSTERECTOMY N/A 5/10/2024    Procedure: TOTAL LAPAROSCOPIC HYSTERECTOMY WITH DAVINCI ROBOT WITH BILATERAL SALPINGECTOMY (TUBES ONLY);  Surgeon: Zechariah  Jaspreet LEE MD;  Location:  [facility] OR;  Service: Robotics - DaVinci;  Laterality: N/A;    UMBILICAL HERNIA REPAIR      Age of 10 or 11    WISDOM TOOTH EXTRACTION       Social History     Socioeconomic History    Marital status:    Tobacco Use    Smoking status: Never    Smokeless tobacco: Never   Vaping Use    Vaping status: Never Used   Substance and Sexual Activity    Alcohol use: Yes     Comment: Occasionally    Drug use: No    Sexual activity: Yes     Partners: Male     Birth control/protection: Condom, Hysterectomy     Family History   Problem Relation Age of Onset    Breast cancer Paternal Grandmother     Cancer Paternal Grandmother         Breast    Diabetes Paternal Grandmother     Early death Paternal Grandmother         32 years of age    Arthritis Maternal Grandfather     Cancer Maternal Grandfather         Skin and Pancreatic    Heart disease Maternal Grandfather     Hyperlipidemia Maternal Grandfather     Prostate cancer Maternal Grandfather     Ovarian cancer Maternal Aunt     Uterine cancer Maternal Aunt     Colon cancer Cousin     Uterine cancer Cousin     Hyperlipidemia Mother     Miscarriages / Stillbirths Mother     Arthritis Maternal Grandmother     Hearing loss Maternal Grandmother     Hyperlipidemia Maternal Grandmother     Miscarriages / Stillbirths Maternal Grandmother     Osteoporosis Maternal Grandmother     Heart disease Paternal Grandfather     Hyperlipidemia Paternal Grandfather     Depression Brother        Medications:  Current Outpatient Medications   Medication Sig Dispense Refill    amphetamine-dextroamphetamine XR (Adderall XR) 30 MG 24 hr capsule Take 1 capsule by mouth Every Morning 30 capsule 0    cyclobenzaprine (FLEXERIL) 10 MG tablet Take 1 tablet by mouth 3 (Three) Times a Day As Needed for Muscle Spasms. 90 tablet 11    escitalopram (Lexapro) 10 MG tablet Take 1 tablet by mouth Daily. 90 tablet 3    metaxalone (Skelaxin) 800 MG tablet Take 1 tablet by mouth 3 (Three)  "Times a Day As Needed for Muscle Spasms. 30 tablet 3    Rimegepant Sulfate (Nurtec) 75 MG tablet dispersible tablet Take 1 tablet by mouth Every Other Day. 15 tablet 11    SUMAtriptan (IMITREX) 100 MG tablet Take 1 tablet by mouth Every 2 (Two) Hours As Needed for Migraine. Take one tablet at onset of headache. May repeat dose one time in 2 hours if headache not relieved. 10 tablet 11    tiZANidine (ZANAFLEX) 4 MG tablet Take 1 tablet by mouth Every 8 (Eight) Hours As Needed for Muscle Spasms. 90 tablet 3    topiramate (TOPAMAX) 50 MG tablet Take 1 tablet by mouth 2 (Two) Times a Day. (Patient not taking: Reported on 6/23/2025) 60 tablet 11    traZODone (DESYREL) 50 MG tablet Take 1 tablet by mouth Every Night. 30 tablet 2     No current facility-administered medications for this visit.       Review of Systems  Review of systems is negative unless otherwise specified in HPI.    Objective   Vital Signs:  /73 (BP Location: Right arm, Patient Position: Sitting, Cuff Size: Adult)   Pulse 80   Temp 98 °F (36.7 °C) (Infrared)   Ht 160 cm (63\")   Wt 81.2 kg (179 lb)   SpO2 99%   BMI 31.71 kg/m²   Estimated body mass index is 31.71 kg/m² as calculated from the following:    Height as of this encounter: 160 cm (63\").    Weight as of this encounter: 81.2 kg (179 lb).       BMI is >= 30 and <35. (Class 1 Obesity). The following options were offered after discussion;: information on healthy weight added to patient's after visit summary     PHQ-9 Depression Screening  Little interest or pleasure in doing things? Not at all   Feeling down, depressed, or hopeless? Not at all   PHQ-2 Total Score 0   Trouble falling or staying asleep, or sleeping too much?     Feeling tired or having little energy?     Poor appetite or overeating?     Feeling bad about yourself - or that you are a failure or have let yourself or your family down?     Trouble concentrating on things, such as reading the newspaper or watching television?   "   Moving or speaking so slowly that other people could have noticed? Or the opposite - being so fidgety or restless that you have been moving around a lot more than usual?     Thoughts that you would be better off dead, or of hurting yourself in some way?     PHQ-9 Total Score     If you checked off any problems, how difficult have these problems made it for you to do your work, take care of things at home, or get along with other people? Not difficult at all        JOSE L-7: Over the last two weeks, how often have you been bothered by the following problems?  Feeling nervous, anxious or on edge: Not at all  Not being able to stop or control worrying: Not at all  Worrying too much about different things: Not at all  Trouble Relaxing: Not at all  Being so restless that it is hard to sit still: Not at all  Becoming easily annoyed or irritable: Not at all  Feeling afraid as if something awful might happen: Not at all  JOSE L 7 Total Score: 0  If you checked any problems, how difficult have these problems made it for you to do your work, take care of things at home, or get along with other people: Not difficult at all      Physical Exam  Vitals and nursing note reviewed.   Constitutional:       Appearance: Normal appearance. She is ill-appearing.   HENT:      Head: Normocephalic.      Right Ear: Tympanic membrane normal.      Left Ear: Tympanic membrane normal.      Nose: Nose normal.      Mouth/Throat:      Mouth: Mucous membranes are moist.      Pharynx: Oropharynx is clear.   Eyes:      Pupils: Pupils are equal, round, and reactive to light.   Cardiovascular:      Rate and Rhythm: Normal rate and regular rhythm.      Pulses: Normal pulses.      Heart sounds: Normal heart sounds.   Pulmonary:      Effort: Pulmonary effort is normal. No respiratory distress.      Breath sounds: Normal breath sounds.   Abdominal:      General: Bowel sounds are normal.      Palpations: Abdomen is soft.   Musculoskeletal:         General:  Normal range of motion.      Cervical back: Normal range of motion.   Skin:     General: Skin is warm and dry.      Capillary Refill: Capillary refill takes less than 2 seconds.   Neurological:      General: No focal deficit present.      Mental Status: She is alert.        Result Review :  The following data was reviewed by: ASAF Aburto on 06/23/2025:  CMP          6/23/2025    15:01   CMP   Glucose 88    BUN 10    Creatinine 0.80    EGFR 97    Sodium 138    Potassium 4.1    Chloride 103    Calcium 9.4    Total Protein 6.9    Albumin 4.5    Globulin 2.4    Total Bilirubin 0.4    Alkaline Phosphatase 111    AST (SGOT) 16    ALT (SGPT) 12    BUN/Creatinine Ratio 13      CBC w/diff          6/23/2025    15:01   CBC w/Diff   WBC 9.6    RBC 4.62    Hemoglobin 14.4    Hematocrit 46.1        MCH 31.2    MCHC 31.2    RDW 14.2    Platelets 284    Neutrophil Rel % 62    Lymphocyte Rel % 31    Monocyte Rel % 6    Eosinophil Rel % 1    Basophil Rel % 0      Lipid Panel          6/23/2025    15:01   Lipid Panel   Total Cholesterol 173    Triglycerides 101    HDL Cholesterol 44    VLDL Cholesterol 19    LDL Cholesterol  110      TSH          6/23/2025    15:01   TSH   TSH 2.030               Assessment and Plan   Diagnoses and all orders for this visit:    1. Annual physical exam (Primary)  -     CBC w AUTO Differential  -     Comprehensive Metabolic Panel  -     TSH  -     Lipid Panel    2. Insomnia, unspecified type  -     traZODone (DESYREL) 50 MG tablet; Take 1 tablet by mouth Every Night.  Dispense: 30 tablet; Refill: 2    3. Galactorrhea  -     FSH & LH    Other orders  -     CBC & Differential  -     Comprehensive Metabolic Panel  -     Lipid Panel  -     TSH      Health Maintenance Counseling:  --Nutrition: Stressed importance of moderation in sodium/caffeine intake, saturated fat and cholesterol, caloric balance, sufficient intake of fresh fruits, vegetables, fiber, calcium and vit D  --Exercise:  Recommended 30 minutes of exercise daily.  Immunizations:      - Tetanus: Received in 2022      - Influenza: Recommend yearly.      - Prevnar: Once after age 50      - Shingrix: Recommend completion of series after 50  CRC screening: Due at 45  Mammogram: Had   PAP: discontinued secondary to benign hysterectomy.  DEXA: DEXA scan at 63    - Insomnia is a new problem. She has tried and not tolerated OTC sleep aids. Will start on trazodone at this time.   - Concerning reports of glalactorrhea and ongoing pelvic pain recommend follow up with OB-GYN. Will obtain routine labs today.            Follow Up   Return in about 1 year (around 6/23/2026) for Annual physical.  Patient was given instructions and counseling regarding her condition or for health maintenance advice. Please see specific information pulled into the AVS if appropriate.     Signed by:    ASAF Aburto Date: 06/29/25

## 2025-06-25 LAB
ALBUMIN SERPL-MCNC: 4.5 G/DL (ref 3.9–4.9)
ALP SERPL-CCNC: 111 IU/L (ref 44–121)
ALT SERPL-CCNC: 12 IU/L (ref 0–32)
AST SERPL-CCNC: 16 IU/L (ref 0–40)
BASOPHILS # BLD AUTO: 0 X10E3/UL (ref 0–0.2)
BASOPHILS NFR BLD AUTO: 0 %
BILIRUB SERPL-MCNC: 0.4 MG/DL (ref 0–1.2)
BUN SERPL-MCNC: 10 MG/DL (ref 6–20)
BUN/CREAT SERPL: 13 (ref 9–23)
CALCIUM SERPL-MCNC: 9.4 MG/DL (ref 8.7–10.2)
CHLORIDE SERPL-SCNC: 103 MMOL/L (ref 96–106)
CHOLEST SERPL-MCNC: 173 MG/DL (ref 100–199)
CO2 SERPL-SCNC: 20 MMOL/L (ref 20–29)
CREAT SERPL-MCNC: 0.8 MG/DL (ref 0.57–1)
EGFRCR SERPLBLD CKD-EPI 2021: 97 ML/MIN/1.73
EOSINOPHIL # BLD AUTO: 0.1 X10E3/UL (ref 0–0.4)
EOSINOPHIL NFR BLD AUTO: 1 %
ERYTHROCYTE [DISTWIDTH] IN BLOOD BY AUTOMATED COUNT: 14.2 % (ref 11.7–15.4)
FSH SERPL-ACNC: 3.2 MIU/ML
GLOBULIN SER CALC-MCNC: 2.4 G/DL (ref 1.5–4.5)
GLUCOSE SERPL-MCNC: 88 MG/DL (ref 70–99)
HCT VFR BLD AUTO: 46.1 % (ref 34–46.6)
HDLC SERPL-MCNC: 44 MG/DL
HGB BLD-MCNC: 14.4 G/DL (ref 11.1–15.9)
IMM GRANULOCYTES # BLD AUTO: 0 X10E3/UL (ref 0–0.1)
IMM GRANULOCYTES NFR BLD AUTO: 0 %
LDLC SERPL CALC-MCNC: 110 MG/DL (ref 0–99)
LH SERPL-ACNC: 1.8 MIU/ML
LYMPHOCYTES # BLD AUTO: 3 X10E3/UL (ref 0.7–3.1)
LYMPHOCYTES NFR BLD AUTO: 31 %
MCH RBC QN AUTO: 31.2 PG (ref 26.6–33)
MCHC RBC AUTO-ENTMCNC: 31.2 G/DL (ref 31.5–35.7)
MCV RBC AUTO: 100 FL (ref 79–97)
MONOCYTES # BLD AUTO: 0.5 X10E3/UL (ref 0.1–0.9)
MONOCYTES NFR BLD AUTO: 6 %
NEUTROPHILS # BLD AUTO: 5.9 X10E3/UL (ref 1.4–7)
NEUTROPHILS NFR BLD AUTO: 62 %
PLATELET # BLD AUTO: 284 X10E3/UL (ref 150–450)
POTASSIUM SERPL-SCNC: 4.1 MMOL/L (ref 3.5–5.2)
PROT SERPL-MCNC: 6.9 G/DL (ref 6–8.5)
RBC # BLD AUTO: 4.62 X10E6/UL (ref 3.77–5.28)
SODIUM SERPL-SCNC: 138 MMOL/L (ref 134–144)
TRIGL SERPL-MCNC: 101 MG/DL (ref 0–149)
TSH SERPL DL<=0.005 MIU/L-ACNC: 2.03 UIU/ML (ref 0.45–4.5)
VLDLC SERPL CALC-MCNC: 19 MG/DL (ref 5–40)
WBC # BLD AUTO: 9.6 X10E3/UL (ref 3.4–10.8)

## 2025-08-18 ENCOUNTER — PRIOR AUTHORIZATION (OUTPATIENT)
Dept: INTERNAL MEDICINE | Facility: CLINIC | Age: 35
End: 2025-08-18
Payer: COMMERCIAL

## 2025-08-26 ENCOUNTER — OFFICE VISIT (OUTPATIENT)
Dept: INTERNAL MEDICINE | Facility: CLINIC | Age: 35
End: 2025-08-26
Payer: COMMERCIAL

## 2025-08-26 VITALS
BODY MASS INDEX: 31.71 KG/M2 | HEART RATE: 89 BPM | WEIGHT: 179 LBS | HEIGHT: 63 IN | TEMPERATURE: 98.6 F | RESPIRATION RATE: 16 BRPM | DIASTOLIC BLOOD PRESSURE: 84 MMHG | SYSTOLIC BLOOD PRESSURE: 128 MMHG | OXYGEN SATURATION: 98 %

## 2025-08-26 DIAGNOSIS — M62.89 PELVIC FLOOR DYSFUNCTION IN FEMALE: ICD-10-CM

## 2025-08-26 DIAGNOSIS — F90.0 ATTENTION DEFICIT HYPERACTIVITY DISORDER, PREDOMINANTLY INATTENTIVE TYPE: Primary | ICD-10-CM

## 2025-08-26 DIAGNOSIS — R25.2 SPASM: ICD-10-CM

## 2025-08-26 DIAGNOSIS — G43.909 MIGRAINE WITHOUT STATUS MIGRAINOSUS, NOT INTRACTABLE, UNSPECIFIED MIGRAINE TYPE: ICD-10-CM

## 2025-08-26 DIAGNOSIS — Z79.899 LONG-TERM USE OF HIGH-RISK MEDICATION: ICD-10-CM

## 2025-08-26 RX ORDER — DEXTROAMPHETAMINE SACCHARATE, AMPHETAMINE ASPARTATE MONOHYDRATE, DEXTROAMPHETAMINE SULFATE AND AMPHETAMINE SULFATE 7.5; 7.5; 7.5; 7.5 MG/1; MG/1; MG/1; MG/1
30 CAPSULE, EXTENDED RELEASE ORAL EVERY MORNING
Qty: 30 CAPSULE | Refills: 0 | Status: SHIPPED | OUTPATIENT
Start: 2025-08-26 | End: 2025-09-25

## 2025-08-26 RX ORDER — SUMATRIPTAN SUCCINATE 100 MG/1
100 TABLET ORAL
Qty: 10 TABLET | Refills: 11 | Status: SHIPPED | OUTPATIENT
Start: 2025-08-26

## 2025-08-26 RX ORDER — DEXTROAMPHETAMINE SACCHARATE, AMPHETAMINE ASPARTATE MONOHYDRATE, DEXTROAMPHETAMINE SULFATE AND AMPHETAMINE SULFATE 7.5; 7.5; 7.5; 7.5 MG/1; MG/1; MG/1; MG/1
30 CAPSULE, EXTENDED RELEASE ORAL EVERY MORNING
Qty: 30 CAPSULE | Refills: 0 | Status: SHIPPED | OUTPATIENT
Start: 2025-10-21 | End: 2025-11-20

## 2025-08-26 RX ORDER — DEXTROAMPHETAMINE SACCHARATE, AMPHETAMINE ASPARTATE MONOHYDRATE, DEXTROAMPHETAMINE SULFATE AND AMPHETAMINE SULFATE 7.5; 7.5; 7.5; 7.5 MG/1; MG/1; MG/1; MG/1
30 CAPSULE, EXTENDED RELEASE ORAL EVERY MORNING
Qty: 30 CAPSULE | Refills: 0 | Status: SHIPPED | OUTPATIENT
Start: 2025-09-23 | End: 2025-10-23

## 2025-08-29 LAB
AMPHETAMINES UR QL SCN: NEGATIVE NG/ML
BARBITURATES UR QL SCN: NEGATIVE NG/ML
BENZODIAZ UR QL SCN: NEGATIVE NG/ML
BZE UR QL SCN: NEGATIVE NG/ML
CANNABINOIDS UR QL SCN: NEGATIVE NG/ML
CREAT UR-MCNC: 18.6 MG/DL (ref 20–300)
FENTANYL UR-MCNC: NEGATIVE PG/ML
LABORATORY COMMENT REPORT: ABNORMAL
MEPERIDINE UR QL: NEGATIVE NG/ML
METHADONE UR QL SCN: NEGATIVE NG/ML
OPIATES UR QL SCN: NEGATIVE NG/ML
OXYCODONE+OXYMORPHONE UR QL SCN: NEGATIVE NG/ML
PCP UR QL: NEGATIVE NG/ML
PH UR: 6 [PH] (ref 4.5–8.9)
PROPOXYPH UR QL SCN: NEGATIVE NG/ML
SP GR UR: 1
TRAMADOL UR QL SCN: NEGATIVE NG/ML

## (undated) DEVICE — SEAL

## (undated) DEVICE — TROC BLADLES ANCHORPORT/OPTI LP 8X120MM 1P/U

## (undated) DEVICE — TIP COVER ACCESSORY

## (undated) DEVICE — DAVINCI: Brand: MEDLINE INDUSTRIES, INC.

## (undated) DEVICE — HEWSON SUTURE RETRIEVER: Brand: HEWSON SUTURE RETRIEVER

## (undated) DEVICE — 1000 SES SMOKE EVACUATION SYSTEM, HAND HELD TUBING SET: Brand: 1000 SES

## (undated) DEVICE — ARM DRAPE

## (undated) DEVICE — TOTAL TRAY, 16FR 10ML SIL FOLEY, URN: Brand: MEDLINE

## (undated) DEVICE — SYS CLOSE PORTII CARTR/THOMASN XL

## (undated) DEVICE — BLADELESS OBTURATOR: Brand: WECK VISTA

## (undated) DEVICE — GLV SURG SENSICARE W/ALOE PF LF 7.5 STRL

## (undated) DEVICE — KT CLN CLEANOR SCPE

## (undated) DEVICE — APPL HEMO ENDO SURGICEL 2IN1 1P/U STRL

## (undated) DEVICE — ANTIBACTERIAL UNDYED BRAIDED (POLYGLACTIN 910), SYNTHETIC ABSORBABLE SUTURE: Brand: COATED VICRYL

## (undated) DEVICE — PK POSTN TRENGUARD450 PROC

## (undated) DEVICE — GLV SURG SENSICARE PI ORTHO SZ6.5 LF STRL

## (undated) DEVICE — ST TBG AIRSEAL FLTR TRI LUM

## (undated) DEVICE — SUREFIT, DUAL DISPERSIVE ELECTRODE, CONTACT QUALITY MONITOR: Brand: SUREFIT

## (undated) DEVICE — CLTH CLENS READYCLEANSE PERI CARE PK/5

## (undated) DEVICE — CYSTO/BLADDER IRRIGATION SET, REGULATING CLAMP